# Patient Record
Sex: FEMALE | Race: BLACK OR AFRICAN AMERICAN | Employment: UNEMPLOYED | URBAN - METROPOLITAN AREA
[De-identification: names, ages, dates, MRNs, and addresses within clinical notes are randomized per-mention and may not be internally consistent; named-entity substitution may affect disease eponyms.]

---

## 2017-03-10 ENCOUNTER — HOSPITAL ENCOUNTER (EMERGENCY)
Age: 37
Discharge: HOME | End: 2017-03-10
Attending: EMERGENCY MEDICINE
Payer: MEDICARE

## 2017-03-10 VITALS
BODY MASS INDEX: 34.15 KG/M2 | WEIGHT: 200 LBS | SYSTOLIC BLOOD PRESSURE: 173 MMHG | HEART RATE: 89 BPM | RESPIRATION RATE: 18 BRPM | OXYGEN SATURATION: 100 % | DIASTOLIC BLOOD PRESSURE: 155 MMHG | TEMPERATURE: 97.2 F | HEIGHT: 64 IN

## 2017-03-10 DIAGNOSIS — G51.0 BELL'S PALSY: Primary | ICD-10-CM

## 2017-03-10 PROCEDURE — 6370000000 HC RX 637 (ALT 250 FOR IP): Performed by: EMERGENCY MEDICINE

## 2017-03-10 PROCEDURE — 99284 EMERGENCY DEPT VISIT MOD MDM: CPT

## 2017-03-10 PROCEDURE — 93005 ELECTROCARDIOGRAM TRACING: CPT

## 2017-03-10 RX ORDER — PREDNISONE 20 MG/1
TABLET ORAL
Qty: 25 TABLET | Refills: 0 | Status: SHIPPED | OUTPATIENT
Start: 2017-03-10 | End: 2017-03-20

## 2017-03-10 RX ORDER — ACYCLOVIR 200 MG/1
200 CAPSULE ORAL ONCE
Status: COMPLETED | OUTPATIENT
Start: 2017-03-10 | End: 2017-03-10

## 2017-03-10 RX ORDER — ACYCLOVIR 200 MG/1
200 CAPSULE ORAL
Qty: 50 CAPSULE | Refills: 0 | Status: SHIPPED | OUTPATIENT
Start: 2017-03-10 | End: 2017-03-20

## 2017-03-10 RX ORDER — PREDNISONE 20 MG/1
60 TABLET ORAL ONCE
Status: COMPLETED | OUTPATIENT
Start: 2017-03-10 | End: 2017-03-10

## 2017-03-10 RX ADMIN — PREDNISONE 60 MG: 20 TABLET ORAL at 14:04

## 2017-03-10 RX ADMIN — ACYCLOVIR 200 MG: 200 CAPSULE ORAL at 14:04

## 2017-03-14 LAB
EKG ATRIAL RATE: 71 BPM
EKG P AXIS: 56 DEGREES
EKG P-R INTERVAL: 174 MS
EKG Q-T INTERVAL: 412 MS
EKG QRS DURATION: 90 MS
EKG QTC CALCULATION (BAZETT): 447 MS
EKG R AXIS: 65 DEGREES
EKG T AXIS: 61 DEGREES
EKG VENTRICULAR RATE: 71 BPM

## 2017-05-31 ENCOUNTER — HOSPITAL ENCOUNTER (OUTPATIENT)
Dept: ULTRASOUND IMAGING | Age: 37
Discharge: HOME | End: 2017-05-31
Payer: MEDICARE

## 2017-05-31 DIAGNOSIS — E04.9 ENLARGED THYROID: ICD-10-CM

## 2017-05-31 PROCEDURE — 76536 US EXAM OF HEAD AND NECK: CPT

## 2017-07-18 ENCOUNTER — HOSPITAL ENCOUNTER (EMERGENCY)
Age: 37
Discharge: HOME | End: 2017-07-18
Attending: EMERGENCY MEDICINE
Payer: MEDICARE

## 2017-07-18 VITALS
BODY MASS INDEX: 35.19 KG/M2 | WEIGHT: 205 LBS | DIASTOLIC BLOOD PRESSURE: 103 MMHG | RESPIRATION RATE: 18 BRPM | HEART RATE: 93 BPM | SYSTOLIC BLOOD PRESSURE: 170 MMHG | TEMPERATURE: 97.3 F | OXYGEN SATURATION: 100 %

## 2017-07-18 PROCEDURE — 99283 EMERGENCY DEPT VISIT LOW MDM: CPT

## 2017-07-18 ASSESSMENT — ENCOUNTER SYMPTOMS
COUGH: 0
SHORTNESS OF BREATH: 0
VOMITING: 0
WHEEZING: 0
CHEST TIGHTNESS: 0
STRIDOR: 0
ABDOMINAL PAIN: 0
ABDOMINAL DISTENTION: 0
DIARRHEA: 0
SORE THROAT: 0
BLOOD IN STOOL: 0
NAUSEA: 0

## 2017-08-18 ENCOUNTER — HOSPITAL ENCOUNTER (EMERGENCY)
Age: 37
Discharge: LEFT BEFORE DISCHARGE | End: 2017-08-18
Payer: MEDICARE

## 2017-08-18 VITALS
OXYGEN SATURATION: 100 % | TEMPERATURE: 97.9 F | HEIGHT: 64 IN | BODY MASS INDEX: 34.15 KG/M2 | HEART RATE: 83 BPM | DIASTOLIC BLOOD PRESSURE: 92 MMHG | WEIGHT: 200 LBS | RESPIRATION RATE: 18 BRPM | SYSTOLIC BLOOD PRESSURE: 139 MMHG

## 2017-09-25 ENCOUNTER — HOSPITAL ENCOUNTER (EMERGENCY)
Age: 37
Discharge: HOME | End: 2017-09-25
Attending: EMERGENCY MEDICINE
Payer: MEDICARE

## 2017-09-25 VITALS
SYSTOLIC BLOOD PRESSURE: 153 MMHG | TEMPERATURE: 97.3 F | WEIGHT: 200 LBS | RESPIRATION RATE: 16 BRPM | BODY MASS INDEX: 34.33 KG/M2 | HEART RATE: 78 BPM | OXYGEN SATURATION: 99 % | DIASTOLIC BLOOD PRESSURE: 97 MMHG

## 2017-09-25 DIAGNOSIS — Z76.0 ENCOUNTER FOR MEDICATION REFILL: Primary | ICD-10-CM

## 2017-09-25 PROCEDURE — 99281 EMR DPT VST MAYX REQ PHY/QHP: CPT

## 2017-09-25 RX ORDER — LISINOPRIL 5 MG/1
5 TABLET ORAL DAILY
Qty: 21 TABLET | Refills: 0 | Status: SHIPPED | OUTPATIENT
Start: 2017-09-25 | End: 2017-10-15 | Stop reason: SDUPTHER

## 2017-09-25 ASSESSMENT — ENCOUNTER SYMPTOMS
COUGH: 0
VOMITING: 0
NAUSEA: 0
ABDOMINAL PAIN: 0
SHORTNESS OF BREATH: 0

## 2017-10-15 ENCOUNTER — HOSPITAL ENCOUNTER (EMERGENCY)
Age: 37
Discharge: HOME | End: 2017-10-15
Attending: EMERGENCY MEDICINE
Payer: MEDICARE

## 2017-10-15 VITALS
HEART RATE: 95 BPM | OXYGEN SATURATION: 98 % | RESPIRATION RATE: 15 BRPM | WEIGHT: 200 LBS | TEMPERATURE: 98.6 F | BODY MASS INDEX: 34.33 KG/M2 | DIASTOLIC BLOOD PRESSURE: 104 MMHG | SYSTOLIC BLOOD PRESSURE: 166 MMHG

## 2017-10-15 DIAGNOSIS — Z76.0 ENCOUNTER FOR MEDICATION REFILL: Primary | ICD-10-CM

## 2017-10-15 PROCEDURE — G0380 LEV 1 HOSP TYPE B ED VISIT: HCPCS

## 2017-10-15 RX ORDER — LISINOPRIL 5 MG/1
5 TABLET ORAL DAILY
Qty: 14 TABLET | Refills: 0 | Status: SHIPPED | OUTPATIENT
Start: 2017-10-15 | End: 2018-07-09

## 2017-10-15 RX ORDER — LISINOPRIL 10 MG/1
5 TABLET ORAL DAILY
Qty: 14 TABLET | Refills: 0 | Status: SHIPPED | OUTPATIENT
Start: 2017-10-15 | End: 2017-10-15 | Stop reason: DRUGHIGH

## 2017-10-15 ASSESSMENT — ENCOUNTER SYMPTOMS
CHEST TIGHTNESS: 0
SHORTNESS OF BREATH: 0

## 2017-10-15 NOTE — ED PROVIDER NOTES
[facility]     Emergency Department     Faculty Attestation    I performed a history and physical examination of the patient and discussed management with the resident. I reviewed the residents note and agree with the documented findings and plan of care. Any areas of disagreement are noted on the chart. I was personally present for the key portions of any procedures. I have documented in the chart those procedures where I was not present during the key portions. I have reviewed the emergency nurses triage note. I agree with the chief complaint, past medical history, past surgical history, allergies, medications, social and family history as documented unless otherwise noted below. For Physician Assistant/ Nurse Practitioner cases/documentation I have personally evaluated this patient and have completed at least one if not all key elements of the E/M (history, physical exam, and MDM). Additional findings are as noted. Patient here for refill for lisinopril, patient has no complaints, sitting up eating a bag of Cheetos, chest clear, heart exam normal.  Patient has not missed a dose of her Lisinopril.     Mukund Nunn MD  Attending Emergency  Physician              Crorina Dunn MD  10/15/17 1200

## 2017-10-15 NOTE — ED PROVIDER NOTES
0.5 tablets by mouth daily 10/15/17  Yes Robbin YorkSHAE rodriguez   omeprazole (PRILOSEC) 10 MG delayed release capsule Take 1 capsule by mouth daily 2/3/17   Bruce Tejada MD   ibuprofen (ADVIL;MOTRIN) 800 MG tablet Take 1 tablet by mouth every 8 hours as needed for Pain 8/25/16   Stephanie Norwood PA-C   oxyCODONE-acetaminophen (PERCOCET) 5-325 MG per tablet Take 1-2 tablets by mouth 2 times daily 12/20/15   Arlicarrillo Rise, DO   beclomethasone (QVAR) 80 MCG/ACT inhaler Inhale 2 puffs into the lungs 2 times daily    Historical Provider, MD       REVIEW OF SYSTEMS    (2-9 systems for level 4, 10 or more for level 5)      Review of Systems   Eyes: Negative for visual disturbance. Respiratory: Negative for chest tightness and shortness of breath. Neurological: Negative for headaches. PHYSICAL EXAM   (up to 7 for level 4, 8 or more for level 5)      INITIAL VITALS:  weight is 200 lb (90.7 kg). Her oral temperature is 98.6 °F (37 °C). Her blood pressure is 166/104 (abnormal) and her pulse is 95. Her respiration is 15 and oxygen saturation is 98%. Physical Exam   Constitutional: She is oriented to person, place, and time. She appears well-developed and well-nourished. No distress. HENT:   Head: Normocephalic and atraumatic. Neck: Normal range of motion. Neck supple. Cardiovascular: Normal rate. Pulmonary/Chest: Effort normal. No respiratory distress. Neurological: She is alert and oriented to person, place, and time. Skin: Skin is warm and dry. Psychiatric: She has a normal mood and affect. Her behavior is normal. Judgment and thought content normal.   Nursing note and vitals reviewed. DIFFERENTIAL  DIAGNOSIS   Hypertension    PLAN (LABS / IMAGING / EKG):  No orders of the defined types were placed in this encounter.       MEDICATIONS ORDERED:  Orders Placed This Encounter   Medications    lisinopril (PRINIVIL) 10 MG tablet     Sig: Take 0.5 tablets by mouth daily     Dispense:  14 tablet Refill:  0       Controlled Substances Monitoring:      DIAGNOSTIC RESULTS / EMERGENCY DEPARTMENT COURSE / MDM     RADIOLOGY:   I directly visualized (with the attending physician) the following  images and reviewed the radiologist interpretations:  No results found. No orders to display       LABS:  No results found for this visit on 10/15/17. EMERGENCY DEPARTMENT COURSE:  I did discuss the importance of maintaining her PCP appointments as this is her second request for this refill for the same medication. Patient reports she is understanding and promises to attend the next appointment. CONSULTS:  None    PROCEDURES:  None    FINAL IMPRESSION      1.  Encounter for medication refill          DISPOSITION / PLAN     DISPOSITION Decision To Discharge    PATIENT REFERRED TO:  [facility] CNP  [address]  [place] [address]  [phone]            DISCHARGE MEDICATIONS:  New Prescriptions    LISINOPRIL (PRINIVIL) 10 MG TABLET    Take 0.5 tablets by mouth daily       Shu Marie, [address]   Emergency Medicine Nurse Practitioner    (Please note that portions of this note were completed with a voice recognition program.  Efforts were made to edit the dictations but occasionally words are mis-transcribed.)        Shu Marie CNP  10/15/17 7250

## 2018-01-08 ENCOUNTER — HOSPITAL ENCOUNTER (EMERGENCY)
Age: 38
Discharge: HOME | End: 2018-01-08
Attending: EMERGENCY MEDICINE
Payer: MEDICAID

## 2018-01-08 VITALS
RESPIRATION RATE: 18 BRPM | HEART RATE: 88 BPM | OXYGEN SATURATION: 98 % | BODY MASS INDEX: 34.15 KG/M2 | SYSTOLIC BLOOD PRESSURE: 103 MMHG | TEMPERATURE: 98.2 F | HEIGHT: 64 IN | WEIGHT: 200 LBS | DIASTOLIC BLOOD PRESSURE: 59 MMHG

## 2018-01-08 DIAGNOSIS — J02.9 PHARYNGITIS, UNSPECIFIED ETIOLOGY: Primary | ICD-10-CM

## 2018-01-08 DIAGNOSIS — K02.9 DENTAL CARIES: ICD-10-CM

## 2018-01-08 PROCEDURE — 6370000000 HC RX 637 (ALT 250 FOR IP): Performed by: EMERGENCY MEDICINE

## 2018-01-08 PROCEDURE — 99282 EMERGENCY DEPT VISIT SF MDM: CPT

## 2018-01-08 RX ORDER — IBUPROFEN 600 MG/1
600 TABLET ORAL EVERY 6 HOURS PRN
Qty: 30 TABLET | Refills: 0 | Status: SHIPPED | OUTPATIENT
Start: 2018-01-08 | End: 2018-01-08

## 2018-01-08 RX ORDER — PENICILLIN V POTASSIUM 500 MG/1
500 TABLET ORAL 4 TIMES DAILY
Qty: 28 TABLET | Refills: 0 | Status: SHIPPED | OUTPATIENT
Start: 2018-01-08 | End: 2018-01-08

## 2018-01-08 RX ORDER — IBUPROFEN 800 MG/1
800 TABLET ORAL ONCE
Status: COMPLETED | OUTPATIENT
Start: 2018-01-08 | End: 2018-01-08

## 2018-01-08 RX ORDER — IBUPROFEN 800 MG/1
800 TABLET ORAL EVERY 8 HOURS PRN
Qty: 30 TABLET | Refills: 0 | Status: SHIPPED | OUTPATIENT
Start: 2018-01-08 | End: 2019-12-16 | Stop reason: SDUPTHER

## 2018-01-08 RX ORDER — PENICILLIN V POTASSIUM 500 MG/1
500 TABLET ORAL 3 TIMES DAILY
Qty: 30 TABLET | Refills: 0 | Status: SHIPPED | OUTPATIENT
Start: 2018-01-08 | End: 2018-01-18

## 2018-01-08 RX ORDER — PENICILLIN V POTASSIUM 250 MG/1
500 TABLET ORAL ONCE
Status: COMPLETED | OUTPATIENT
Start: 2018-01-08 | End: 2018-01-08

## 2018-01-08 RX ADMIN — IBUPROFEN 800 MG: 800 TABLET, FILM COATED ORAL at 12:36

## 2018-01-08 RX ADMIN — PENICILLIN V POTASSIUM 500 MG: 250 TABLET ORAL at 12:36

## 2018-01-08 ASSESSMENT — PAIN DESCRIPTION - LOCATION: LOCATION: EAR;MOUTH

## 2018-01-08 ASSESSMENT — ENCOUNTER SYMPTOMS
APNEA: 0
ANAL BLEEDING: 0
BACK PAIN: 0
SORE THROAT: 1
CHEST TIGHTNESS: 0
ABDOMINAL DISTENTION: 0

## 2018-01-08 ASSESSMENT — PAIN DESCRIPTION - FREQUENCY: FREQUENCY: CONTINUOUS

## 2018-01-08 ASSESSMENT — PAIN SCALES - GENERAL
PAINLEVEL_OUTOF10: 10
PAINLEVEL_OUTOF10: 10

## 2018-01-08 ASSESSMENT — PAIN DESCRIPTION - PAIN TYPE: TYPE: ACUTE PAIN

## 2018-01-08 ASSESSMENT — PAIN DESCRIPTION - ORIENTATION: ORIENTATION: LEFT

## 2018-01-08 ASSESSMENT — PAIN DESCRIPTION - DESCRIPTORS: DESCRIPTORS: CONSTANT

## 2018-01-08 ASSESSMENT — PAIN DESCRIPTION - PROGRESSION: CLINICAL_PROGRESSION: NOT CHANGED

## 2018-01-08 NOTE — ED PROVIDER NOTES
[address] [facility]     Emergency Department     Faculty Attestation    I performed a history and physical examination of the patient and discussed management with the resident. I have reviewed and agree with the residents findings including all diagnostic interpretations, and treatment plans as written. Any areas of disagreement are noted on the chart. I was personally present for the key portions of any procedures. I have documented in the chart those procedures where I was not present during the key portions. I have reviewed the emergency nurses triage note. I agree with the chief complaint, past medical history, past surgical history, allergies, medications, social and family history as documented unless otherwise noted below. Documentation of the HPI, Physical Exam and Medical Decision Making performed by scribmirta is based on my personal performance of the HPI, PE and MDM. For Physician Assistant/ Nurse Practitioner cases/documentation I have personally evaluated this patient and have completed at least one if not all key elements of the E/M (history, physical exam, and MDM). Additional findings are as noted. Primary Care Physician: Tommy Guerra CNP    History: This is a 44 y.o. female who presents to the Emergency Department with complaint of Sore throat, dental pain, ear pain. Physical:   height is 5' 4\" (1.626 m) and weight is 200 lb (90.7 kg). Her oral temperature is 98.2 °F (36.8 °C). Her blood pressure is 103/59 (abnormal) and her pulse is 88. Her respiration is 18 and oxygen saturation is 98%. Temperament are bilaterally posterior pharynx is minimally erythematous is no exudate is midline mucous members are moist, there is no dental abscess. Dental caries are noted. No tongue elevation no pooling of oral secretions, airway is patent.     Impression: Dental pain, caries,Pharyngitis    Plan: Antibiotics, analgesia, and paternal clinic
occasionally words are mis-transcribed.)    Carmen Restrepo  Emergency Medicine Resident           Carmen Restrepo MD  Resident  01/08/18 3290

## 2018-07-09 ENCOUNTER — HOSPITAL ENCOUNTER (EMERGENCY)
Age: 38
Discharge: HOME | End: 2018-07-09
Attending: EMERGENCY MEDICINE
Payer: MEDICARE

## 2018-07-09 VITALS
HEART RATE: 84 BPM | SYSTOLIC BLOOD PRESSURE: 143 MMHG | TEMPERATURE: 97.7 F | DIASTOLIC BLOOD PRESSURE: 89 MMHG | OXYGEN SATURATION: 100 % | RESPIRATION RATE: 15 BRPM

## 2018-07-09 DIAGNOSIS — Z76.0 ENCOUNTER FOR MEDICATION REFILL: Primary | ICD-10-CM

## 2018-07-09 PROCEDURE — 99281 EMR DPT VST MAYX REQ PHY/QHP: CPT

## 2018-07-09 PROCEDURE — 6370000000 HC RX 637 (ALT 250 FOR IP): Performed by: NURSE PRACTITIONER

## 2018-07-09 RX ORDER — LISINOPRIL 5 MG/1
5 TABLET ORAL DAILY
Qty: 30 TABLET | Refills: 0 | Status: SHIPPED | OUTPATIENT
Start: 2018-07-09

## 2018-07-09 RX ORDER — LISINOPRIL 10 MG/1
5 TABLET ORAL DAILY
Status: DISCONTINUED | OUTPATIENT
Start: 2018-07-09 | End: 2018-07-09 | Stop reason: HOSPADM

## 2018-07-09 RX ADMIN — LISINOPRIL 5 MG: 10 TABLET ORAL at 12:50

## 2018-07-09 ASSESSMENT — ENCOUNTER SYMPTOMS: SHORTNESS OF BREATH: 0

## 2019-08-07 ENCOUNTER — HOSPITAL ENCOUNTER (INPATIENT)
Age: 39
LOS: 3 days | Discharge: HOME | DRG: 720 | End: 2019-08-10
Attending: EMERGENCY MEDICINE | Admitting: INTERNAL MEDICINE
Payer: MEDICARE

## 2019-08-07 ENCOUNTER — APPOINTMENT (OUTPATIENT)
Dept: GENERAL RADIOLOGY | Age: 39
DRG: 720 | End: 2019-08-07
Payer: MEDICARE

## 2019-08-07 DIAGNOSIS — R52 BODY ACHES: ICD-10-CM

## 2019-08-07 DIAGNOSIS — J02.0 STREP PHARYNGITIS: Primary | ICD-10-CM

## 2019-08-07 LAB
-: ABNORMAL
-: NORMAL
ABSOLUTE EOS #: 0 K/UL (ref 0–0.4)
ABSOLUTE IMMATURE GRANULOCYTE: 0 K/UL (ref 0–0.3)
ABSOLUTE LYMPH #: 1.45 K/UL (ref 1–4.8)
ABSOLUTE MONO #: 1.86 K/UL (ref 0.1–0.8)
ALBUMIN SERPL-MCNC: 4.2 G/DL (ref 3.5–5.2)
ALBUMIN/GLOBULIN RATIO: 0.9 (ref 1–2.5)
ALLEN TEST: ABNORMAL
ALP BLD-CCNC: 62 U/L (ref 35–104)
ALT SERPL-CCNC: 17 U/L (ref 5–33)
AMORPHOUS: ABNORMAL
ANION GAP SERPL CALCULATED.3IONS-SCNC: 15 MMOL/L (ref 9–17)
ANION GAP: 11 MMOL/L (ref 7–16)
AST SERPL-CCNC: 60 U/L
ATYPICAL LYMPHOCYTE ABSOLUTE COUNT: 0.21 K/UL
ATYPICAL LYMPHOCYTES: 1 %
BACTERIA: ABNORMAL
BASOPHILS # BLD: 0 % (ref 0–2)
BASOPHILS ABSOLUTE: 0 K/UL (ref 0–0.2)
BILIRUB SERPL-MCNC: 0.69 MG/DL (ref 0.3–1.2)
BILIRUBIN URINE: NEGATIVE
BUN BLDV-MCNC: 4 MG/DL (ref 6–20)
BUN/CREAT BLD: ABNORMAL (ref 9–20)
CALCIUM SERPL-MCNC: 9.3 MG/DL (ref 8.6–10.4)
CASTS UA: ABNORMAL /LPF (ref 0–8)
CHLORIDE BLD-SCNC: 95 MMOL/L (ref 98–107)
CO2: 19 MMOL/L (ref 20–31)
COLOR: YELLOW
CREAT SERPL-MCNC: 0.52 MG/DL (ref 0.5–0.9)
CRYSTALS, UA: ABNORMAL /HPF
DIFFERENTIAL TYPE: ABNORMAL
DIRECT EXAM: ABNORMAL
DIRECT EXAM: NORMAL
EOSINOPHILS RELATIVE PERCENT: 0 % (ref 1–4)
EPITHELIAL CELLS UA: ABNORMAL /HPF (ref 0–5)
FIO2: ABNORMAL
GFR AFRICAN AMERICAN: >60 ML/MIN
GFR NON-AFRICAN AMERICAN: >60 ML/MIN
GFR NON-AFRICAN AMERICAN: >60 ML/MIN
GFR SERPL CREATININE-BSD FRML MDRD: >60 ML/MIN
GFR SERPL CREATININE-BSD FRML MDRD: ABNORMAL ML/MIN/{1.73_M2}
GFR SERPL CREATININE-BSD FRML MDRD: ABNORMAL ML/MIN/{1.73_M2}
GFR SERPL CREATININE-BSD FRML MDRD: NORMAL ML/MIN/{1.73_M2}
GLUCOSE BLD-MCNC: 124 MG/DL (ref 70–99)
GLUCOSE BLD-MCNC: 133 MG/DL (ref 74–100)
GLUCOSE URINE: NEGATIVE
HCO3 VENOUS: 20.5 MMOL/L (ref 22–29)
HCT VFR BLD CALC: 29.5 % (ref 36.3–47.1)
HEMOGLOBIN: 7.6 G/DL (ref 11.9–15.1)
IMMATURE GRANULOCYTES: 0 %
INR BLD: 1
KETONES, URINE: NEGATIVE
LACTIC ACID, SEPSIS WHOLE BLOOD: 2.5 MMOL/L (ref 0.5–1.9)
LACTIC ACID, SEPSIS: ABNORMAL MMOL/L (ref 0.5–1.9)
LEUKOCYTE ESTERASE, URINE: NEGATIVE
LYMPHOCYTES # BLD: 7 % (ref 24–44)
Lab: ABNORMAL
Lab: NORMAL
MCH RBC QN AUTO: 15.2 PG (ref 25.2–33.5)
MCHC RBC AUTO-ENTMCNC: 25.8 G/DL (ref 28.4–34.8)
MCV RBC AUTO: 59.1 FL (ref 82.6–102.9)
MODE: ABNORMAL
MONOCYTES # BLD: 9 % (ref 1–7)
MORPHOLOGY: ABNORMAL
MUCUS: ABNORMAL
NEGATIVE BASE EXCESS, VEN: 3 (ref 0–2)
NITRITE, URINE: NEGATIVE
NRBC AUTOMATED: 0 PER 100 WBC
O2 DEVICE/FLOW/%: ABNORMAL
O2 SAT, VEN: 56 % (ref 60–85)
OTHER OBSERVATIONS UA: ABNORMAL
PARTIAL THROMBOPLASTIN TIME: 24.7 SEC (ref 20.5–30.5)
PATIENT TEMP: ABNORMAL
PCO2, VEN: 30.1 MM HG (ref 41–51)
PDW BLD-RTO: 22.4 % (ref 11.8–14.4)
PH UA: 8 (ref 5–8)
PH VENOUS: 7.44 (ref 7.32–7.43)
PLATELET # BLD: ABNORMAL K/UL (ref 138–453)
PLATELET ESTIMATE: ABNORMAL
PLATELET, FLUORESCENCE: 299 K/UL (ref 138–453)
PLATELET, IMMATURE FRACTION: 5 % (ref 1.1–10.3)
PMV BLD AUTO: ABNORMAL FL (ref 8.1–13.5)
PO2, VEN: 27.9 MM HG (ref 30–50)
POC CHLORIDE: 101 MMOL/L (ref 98–107)
POC CREATININE: 0.53 MG/DL (ref 0.51–1.19)
POC HEMATOCRIT: 32 % (ref 36–46)
POC HEMOGLOBIN: 10.8 G/DL (ref 12–16)
POC IONIZED CALCIUM: 1.12 MMOL/L (ref 1.15–1.33)
POC LACTIC ACID: 0.69 MMOL/L (ref 0.56–1.39)
POC LACTIC ACID: 2.3 MMOL/L (ref 0.56–1.39)
POC PCO2 TEMP: ABNORMAL MM HG
POC PH TEMP: ABNORMAL
POC PO2 TEMP: ABNORMAL MM HG
POC POTASSIUM: 4 MMOL/L (ref 3.5–4.5)
POC SODIUM: 132 MMOL/L (ref 138–146)
POSITIVE BASE EXCESS, VEN: ABNORMAL (ref 0–3)
POTASSIUM SERPL-SCNC: 4.1 MMOL/L (ref 3.7–5.3)
PROTEIN UA: ABNORMAL
PROTHROMBIN TIME: 10.8 SEC (ref 9–12)
RBC # BLD: 4.99 M/UL (ref 3.95–5.11)
RBC # BLD: ABNORMAL 10*6/UL
RBC UA: ABNORMAL /HPF (ref 0–4)
REASON FOR REJECTION: NORMAL
RENAL EPITHELIAL, UA: ABNORMAL /HPF
SAMPLE SITE: ABNORMAL
SEG NEUTROPHILS: 83 % (ref 36–66)
SEGMENTED NEUTROPHILS ABSOLUTE COUNT: 17.18 K/UL (ref 1.8–7.7)
SODIUM BLD-SCNC: 129 MMOL/L (ref 135–144)
SPECIFIC GRAVITY UA: 1.01 (ref 1–1.03)
SPECIMEN DESCRIPTION: ABNORMAL
SPECIMEN DESCRIPTION: NORMAL
TOTAL CK: 266 U/L (ref 26–192)
TOTAL CO2, VENOUS: 22 MMOL/L (ref 23–30)
TOTAL PROTEIN: 9.1 G/DL (ref 6.4–8.3)
TRICHOMONAS: ABNORMAL
TROPONIN INTERP: NORMAL
TROPONIN INTERP: NORMAL
TROPONIN T: NORMAL NG/ML
TROPONIN T: NORMAL NG/ML
TROPONIN, HIGH SENSITIVITY: 9 NG/L (ref 0–14)
TROPONIN, HIGH SENSITIVITY: 9 NG/L (ref 0–14)
TURBIDITY: CLEAR
URINE HGB: ABNORMAL
UROBILINOGEN, URINE: NORMAL
WBC # BLD: 20.7 K/UL (ref 3.5–11.3)
WBC # BLD: ABNORMAL 10*3/UL
WBC UA: ABNORMAL /HPF (ref 0–5)
YEAST: ABNORMAL
ZZ NTE CLEAN UP: ORDERED TEST: NORMAL
ZZ NTE WITH NAME CLEAN UP: SPECIMEN SOURCE: NORMAL

## 2019-08-07 PROCEDURE — 96367 TX/PROPH/DG ADDL SEQ IV INF: CPT

## 2019-08-07 PROCEDURE — 93005 ELECTROCARDIOGRAM TRACING: CPT | Performed by: STUDENT IN AN ORGANIZED HEALTH CARE EDUCATION/TRAINING PROGRAM

## 2019-08-07 PROCEDURE — 86901 BLOOD TYPING SEROLOGIC RH(D): CPT

## 2019-08-07 PROCEDURE — 82947 ASSAY GLUCOSE BLOOD QUANT: CPT

## 2019-08-07 PROCEDURE — 84484 ASSAY OF TROPONIN QUANT: CPT

## 2019-08-07 PROCEDURE — 6370000000 HC RX 637 (ALT 250 FOR IP): Performed by: STUDENT IN AN ORGANIZED HEALTH CARE EDUCATION/TRAINING PROGRAM

## 2019-08-07 PROCEDURE — 87880 STREP A ASSAY W/OPTIC: CPT

## 2019-08-07 PROCEDURE — 82550 ASSAY OF CK (CPK): CPT

## 2019-08-07 PROCEDURE — 84295 ASSAY OF SERUM SODIUM: CPT

## 2019-08-07 PROCEDURE — 86900 BLOOD TYPING SEROLOGIC ABO: CPT

## 2019-08-07 PROCEDURE — 87086 URINE CULTURE/COLONY COUNT: CPT

## 2019-08-07 PROCEDURE — 82330 ASSAY OF CALCIUM: CPT

## 2019-08-07 PROCEDURE — 96365 THER/PROPH/DIAG IV INF INIT: CPT

## 2019-08-07 PROCEDURE — 2580000003 HC RX 258: Performed by: STUDENT IN AN ORGANIZED HEALTH CARE EDUCATION/TRAINING PROGRAM

## 2019-08-07 PROCEDURE — 2060000000 HC ICU INTERMEDIATE R&B

## 2019-08-07 PROCEDURE — 85610 PROTHROMBIN TIME: CPT

## 2019-08-07 PROCEDURE — 84132 ASSAY OF SERUM POTASSIUM: CPT

## 2019-08-07 PROCEDURE — 81001 URINALYSIS AUTO W/SCOPE: CPT

## 2019-08-07 PROCEDURE — 71045 X-RAY EXAM CHEST 1 VIEW: CPT

## 2019-08-07 PROCEDURE — 85055 RETICULATED PLATELET ASSAY: CPT

## 2019-08-07 PROCEDURE — 99285 EMERGENCY DEPT VISIT HI MDM: CPT

## 2019-08-07 PROCEDURE — 6360000002 HC RX W HCPCS: Performed by: STUDENT IN AN ORGANIZED HEALTH CARE EDUCATION/TRAINING PROGRAM

## 2019-08-07 PROCEDURE — 82803 BLOOD GASES ANY COMBINATION: CPT

## 2019-08-07 PROCEDURE — 86920 COMPATIBILITY TEST SPIN: CPT

## 2019-08-07 PROCEDURE — 80053 COMPREHEN METABOLIC PANEL: CPT

## 2019-08-07 PROCEDURE — 87804 INFLUENZA ASSAY W/OPTIC: CPT

## 2019-08-07 PROCEDURE — 86850 RBC ANTIBODY SCREEN: CPT

## 2019-08-07 PROCEDURE — 87040 BLOOD CULTURE FOR BACTERIA: CPT

## 2019-08-07 PROCEDURE — 82565 ASSAY OF CREATININE: CPT

## 2019-08-07 PROCEDURE — 96366 THER/PROPH/DIAG IV INF ADDON: CPT

## 2019-08-07 PROCEDURE — 85014 HEMATOCRIT: CPT

## 2019-08-07 PROCEDURE — 83605 ASSAY OF LACTIC ACID: CPT

## 2019-08-07 PROCEDURE — 85025 COMPLETE CBC W/AUTO DIFF WBC: CPT

## 2019-08-07 PROCEDURE — 82435 ASSAY OF BLOOD CHLORIDE: CPT

## 2019-08-07 PROCEDURE — 85730 THROMBOPLASTIN TIME PARTIAL: CPT

## 2019-08-07 RX ORDER — 0.9 % SODIUM CHLORIDE 0.9 %
1000 INTRAVENOUS SOLUTION INTRAVENOUS ONCE
Status: COMPLETED | OUTPATIENT
Start: 2019-08-07 | End: 2019-08-08

## 2019-08-07 RX ORDER — ACETAMINOPHEN 500 MG
1000 TABLET ORAL ONCE
Status: COMPLETED | OUTPATIENT
Start: 2019-08-07 | End: 2019-08-07

## 2019-08-07 RX ADMIN — Medication 1250 MG: at 20:14

## 2019-08-07 RX ADMIN — ACETAMINOPHEN 1000 MG: 500 TABLET ORAL at 18:02

## 2019-08-07 RX ADMIN — SODIUM CHLORIDE 1000 ML: 9 INJECTION, SOLUTION INTRAVENOUS at 18:02

## 2019-08-07 RX ADMIN — PIPERACILLIN AND TAZOBACTAM 3.38 G: 3; .375 INJECTION, POWDER, FOR SOLUTION INTRAVENOUS at 19:41

## 2019-08-07 ASSESSMENT — PAIN DESCRIPTION - LOCATION: LOCATION: GENERALIZED

## 2019-08-07 ASSESSMENT — PAIN SCALES - GENERAL
PAINLEVEL_OUTOF10: 10
PAINLEVEL_OUTOF10: 9

## 2019-08-07 ASSESSMENT — PAIN DESCRIPTION - PAIN TYPE: TYPE: ACUTE PAIN

## 2019-08-07 NOTE — ED PROVIDER NOTES
CO2, Venous 22 (*)     Negative Base Excess, Callum 3 (*)     O2 Sat, Callum 56 (*)     All other components within normal limits   LACTIC ACID,POINT OF CARE - Abnormal; Notable for the following components:    POC Lactic Acid 2.30 (*)     All other components within normal limits   POCT GLUCOSE - Abnormal; Notable for the following components:    POC Glucose 133 (*)     All other components within normal limits   RAPID INFLUENZA A/B ANTIGENS   CULTURE BLOOD #1   CULTURE BLOOD #1   URINE CULTURE   PROTIME-INR   TROPONIN   TROPONIN   APTT   POTASSIUM (POC)   CHLORIDE (POC)   IMMATURE PLATELET FRACTION   LACTATE, SEPSIS   CK   POC BLOOD GAS AND CHEMISTRY   POC BLOOD GAS AND CHEMISTRY   CREATININE W/GFR POINT OF CARE   ANION GAP (CALC) POC   TYPE AND SCREEN   PREPARE RBC (CROSSMATCH)         RADIOLOGY:  Xr Chest Portable    Result Date: 8/7/2019  EXAMINATION: ONE XRAY VIEW OF THE CHEST 8/7/2019 5:50 pm COMPARISON: 06/29/2015 HISTORY: ORDERING SYSTEM PROVIDED HISTORY: concern for pneumonia TECHNOLOGIST PROVIDED HISTORY: concern for pneumonia Reason for Exam: port Upright, congestion, cough Acuity: Unknown Type of Exam: Unknown FINDINGS: Right greater than left curvilinear parenchymal scars again noted. There are dense breast shadows causing hazy opacity at both lung bases. Lungs are otherwise grossly clear. No pneumothorax or pleural fluid. Heart size and configuration are normal.  No acute bone finding. Right greater than left parenchymal scars. Hazy opacity at the lung bases most consistent with overlying dense breast shadows. No acute cardiopulmonary disease.          EKG  EKG Interpretation    Interpreted by me    Rhythm: normal sinus   Rate: normal  Axis: normal  Ectopy: none  Conduction: normal  ST Segments: no acute change  T Waves: no acute change  Q Waves: none    Clinical Impression: no acute changes and normal EKG    All EKG's are interpreted by the Emergency Department Physicianwho either signs or

## 2019-08-08 PROBLEM — E87.6 HYPOKALEMIA: Status: ACTIVE | Noted: 2019-08-08

## 2019-08-08 PROBLEM — F19.10 POLYSUBSTANCE ABUSE (HCC): Status: ACTIVE | Noted: 2019-08-08

## 2019-08-08 PROBLEM — D72.9 NEUTROPHILIC LEUKOCYTOSIS: Status: ACTIVE | Noted: 2019-08-08

## 2019-08-08 PROBLEM — D64.9 SYMPTOMATIC ANEMIA: Status: ACTIVE | Noted: 2019-08-08

## 2019-08-08 PROBLEM — E87.1 HYPONATREMIA: Status: ACTIVE | Noted: 2019-08-08

## 2019-08-08 PROBLEM — A40.0 SEPSIS DUE TO GROUP A STREPTOCOCCUS (HCC): Status: ACTIVE | Noted: 2019-08-08

## 2019-08-08 PROBLEM — D50.9 IRON DEFICIENCY ANEMIA: Status: ACTIVE | Noted: 2019-08-08

## 2019-08-08 PROBLEM — F14.90 CRACK COCAINE USE: Status: ACTIVE | Noted: 2019-08-08

## 2019-08-08 LAB
ABSOLUTE RETIC #: 0.08 M/UL (ref 0.03–0.08)
ALBUMIN SERPL-MCNC: 3.6 G/DL (ref 3.5–5.2)
ALBUMIN/GLOBULIN RATIO: 0.8 (ref 1–2.5)
ALP BLD-CCNC: 58 U/L (ref 35–104)
ALT SERPL-CCNC: 12 U/L (ref 5–33)
ANION GAP SERPL CALCULATED.3IONS-SCNC: 16 MMOL/L (ref 9–17)
AST SERPL-CCNC: 39 U/L
BILIRUB SERPL-MCNC: 0.66 MG/DL (ref 0.3–1.2)
BILIRUBIN DIRECT: 0.12 MG/DL
BILIRUBIN, INDIRECT: 0.54 MG/DL (ref 0–1)
BUN BLDV-MCNC: 5 MG/DL (ref 6–20)
BUN/CREAT BLD: ABNORMAL (ref 9–20)
C-REACTIVE PROTEIN: 123.4 MG/L (ref 0–5)
CALCIUM SERPL-MCNC: 8.9 MG/DL (ref 8.6–10.4)
CHLORIDE BLD-SCNC: 96 MMOL/L (ref 98–107)
CO2: 19 MMOL/L (ref 20–31)
CREAT SERPL-MCNC: 0.55 MG/DL (ref 0.5–0.9)
CULTURE: NORMAL
EKG ATRIAL RATE: 108 BPM
EKG P AXIS: 61 DEGREES
EKG P-R INTERVAL: 144 MS
EKG Q-T INTERVAL: 334 MS
EKG QRS DURATION: 86 MS
EKG QTC CALCULATION (BAZETT): 447 MS
EKG R AXIS: 77 DEGREES
EKG T AXIS: 64 DEGREES
EKG VENTRICULAR RATE: 108 BPM
FERRITIN: 29 UG/L (ref 13–150)
GFR AFRICAN AMERICAN: >60 ML/MIN
GFR NON-AFRICAN AMERICAN: >60 ML/MIN
GFR SERPL CREATININE-BSD FRML MDRD: ABNORMAL ML/MIN/{1.73_M2}
GFR SERPL CREATININE-BSD FRML MDRD: ABNORMAL ML/MIN/{1.73_M2}
GLOBULIN: ABNORMAL G/DL (ref 1.5–3.8)
GLUCOSE BLD-MCNC: 124 MG/DL (ref 70–99)
HAPTOGLOBIN: 237 MG/DL (ref 30–200)
HCT VFR BLD CALC: 26.6 % (ref 36.3–47.1)
HCT VFR BLD CALC: 26.8 % (ref 36.3–47.1)
HCT VFR BLD CALC: 27.6 % (ref 36.3–47.1)
HEMOGLOBIN: 6.6 G/DL (ref 11.9–15.1)
HEMOGLOBIN: 7.1 G/DL (ref 11.9–15.1)
HEMOGLOBIN: 7.1 G/DL (ref 11.9–15.1)
IMMATURE RETIC FRACT: 22.1 % (ref 2.7–18.3)
IRON SATURATION: 5 % (ref 20–55)
IRON: 18 UG/DL (ref 37–145)
LACTATE DEHYDROGENASE: 581 U/L (ref 135–214)
LACTIC ACID, WHOLE BLOOD: 1.3 MMOL/L (ref 0.7–2.1)
LACTIC ACID: NORMAL MMOL/L
Lab: NORMAL
MAGNESIUM: 1.8 MG/DL (ref 1.6–2.6)
MCH RBC QN AUTO: 15.5 PG (ref 25.2–33.5)
MCHC RBC AUTO-ENTMCNC: 25.7 G/DL (ref 28.4–34.8)
MCV RBC AUTO: 60.1 FL (ref 82.6–102.9)
NRBC AUTOMATED: 0 PER 100 WBC
PDW BLD-RTO: 22.4 % (ref 11.8–14.4)
PLATELET # BLD: ABNORMAL K/UL (ref 138–453)
PLATELET, FLUORESCENCE: 270 K/UL (ref 138–453)
PLATELET, IMMATURE FRACTION: 5 % (ref 1.1–10.3)
PMV BLD AUTO: ABNORMAL FL (ref 8.1–13.5)
POC LACTIC ACID: 3.59 MMOL/L (ref 0.56–1.39)
POTASSIUM SERPL-SCNC: 2.8 MMOL/L (ref 3.7–5.3)
POTASSIUM SERPL-SCNC: 3.4 MMOL/L (ref 3.7–5.3)
RBC # BLD: 4.59 M/UL (ref 3.95–5.11)
RETIC %: 1.8 % (ref 0.5–1.9)
RETIC HEMOGLOBIN: 16.6 PG (ref 28.2–35.7)
SEDIMENTATION RATE, ERYTHROCYTE: 32 MM (ref 0–20)
SODIUM BLD-SCNC: 131 MMOL/L (ref 135–144)
SPECIMEN DESCRIPTION: NORMAL
TOTAL IRON BINDING CAPACITY: 399 UG/DL (ref 250–450)
TOTAL PROTEIN: 8.3 G/DL (ref 6.4–8.3)
UNSATURATED IRON BINDING CAPACITY: 381 UG/DL (ref 112–347)
VANCOMYCIN TROUGH DATE LAST DOSE: ABNORMAL
VANCOMYCIN TROUGH DOSE AMOUNT: ABNORMAL
VANCOMYCIN TROUGH TIME LAST DOSE: ABNORMAL
VANCOMYCIN TROUGH: 8.2 UG/ML (ref 10–20)
WBC # BLD: 25.1 K/UL (ref 3.5–11.3)

## 2019-08-08 PROCEDURE — 85651 RBC SED RATE NONAUTOMATED: CPT

## 2019-08-08 PROCEDURE — 76937 US GUIDE VASCULAR ACCESS: CPT

## 2019-08-08 PROCEDURE — 85014 HEMATOCRIT: CPT

## 2019-08-08 PROCEDURE — 2580000003 HC RX 258: Performed by: NURSE PRACTITIONER

## 2019-08-08 PROCEDURE — 6370000000 HC RX 637 (ALT 250 FOR IP): Performed by: INTERNAL MEDICINE

## 2019-08-08 PROCEDURE — 2580000003 HC RX 258: Performed by: INTERNAL MEDICINE

## 2019-08-08 PROCEDURE — 85027 COMPLETE CBC AUTOMATED: CPT

## 2019-08-08 PROCEDURE — 80048 BASIC METABOLIC PNL TOTAL CA: CPT

## 2019-08-08 PROCEDURE — 6370000000 HC RX 637 (ALT 250 FOR IP): Performed by: NURSE PRACTITIONER

## 2019-08-08 PROCEDURE — 85055 RETICULATED PLATELET ASSAY: CPT

## 2019-08-08 PROCEDURE — 84132 ASSAY OF SERUM POTASSIUM: CPT

## 2019-08-08 PROCEDURE — 83605 ASSAY OF LACTIC ACID: CPT

## 2019-08-08 PROCEDURE — 2500000003 HC RX 250 WO HCPCS: Performed by: INTERNAL MEDICINE

## 2019-08-08 PROCEDURE — 83550 IRON BINDING TEST: CPT

## 2019-08-08 PROCEDURE — 6360000002 HC RX W HCPCS: Performed by: NURSE PRACTITIONER

## 2019-08-08 PROCEDURE — 99223 1ST HOSP IP/OBS HIGH 75: CPT | Performed by: INTERNAL MEDICINE

## 2019-08-08 PROCEDURE — 80076 HEPATIC FUNCTION PANEL: CPT

## 2019-08-08 PROCEDURE — 2060000000 HC ICU INTERMEDIATE R&B

## 2019-08-08 PROCEDURE — 83735 ASSAY OF MAGNESIUM: CPT

## 2019-08-08 PROCEDURE — 87040 BLOOD CULTURE FOR BACTERIA: CPT

## 2019-08-08 PROCEDURE — 36415 COLL VENOUS BLD VENIPUNCTURE: CPT

## 2019-08-08 PROCEDURE — 85018 HEMOGLOBIN: CPT

## 2019-08-08 PROCEDURE — 83615 LACTATE (LD) (LDH) ENZYME: CPT

## 2019-08-08 PROCEDURE — 83010 ASSAY OF HAPTOGLOBIN QUANT: CPT

## 2019-08-08 PROCEDURE — 82728 ASSAY OF FERRITIN: CPT

## 2019-08-08 PROCEDURE — 83540 ASSAY OF IRON: CPT

## 2019-08-08 PROCEDURE — 85045 AUTOMATED RETICULOCYTE COUNT: CPT

## 2019-08-08 PROCEDURE — 36430 TRANSFUSION BLD/BLD COMPNT: CPT

## 2019-08-08 PROCEDURE — 83020 HEMOGLOBIN ELECTROPHORESIS: CPT

## 2019-08-08 PROCEDURE — 93010 ELECTROCARDIOGRAM REPORT: CPT | Performed by: INTERNAL MEDICINE

## 2019-08-08 PROCEDURE — 86140 C-REACTIVE PROTEIN: CPT

## 2019-08-08 PROCEDURE — 86900 BLOOD TYPING SEROLOGIC ABO: CPT

## 2019-08-08 PROCEDURE — P9016 RBC LEUKOCYTES REDUCED: HCPCS

## 2019-08-08 PROCEDURE — 80202 ASSAY OF VANCOMYCIN: CPT

## 2019-08-08 RX ORDER — SODIUM CHLORIDE 9 MG/ML
INJECTION, SOLUTION INTRAVENOUS CONTINUOUS
Status: DISCONTINUED | OUTPATIENT
Start: 2019-08-08 | End: 2019-08-10 | Stop reason: HOSPADM

## 2019-08-08 RX ORDER — 0.9 % SODIUM CHLORIDE 0.9 %
250 INTRAVENOUS SOLUTION INTRAVENOUS ONCE
Status: DISCONTINUED | OUTPATIENT
Start: 2019-08-08 | End: 2019-08-10 | Stop reason: HOSPADM

## 2019-08-08 RX ORDER — 0.9 % SODIUM CHLORIDE 0.9 %
250 INTRAVENOUS SOLUTION INTRAVENOUS ONCE
Status: COMPLETED | OUTPATIENT
Start: 2019-08-08 | End: 2019-08-08

## 2019-08-08 RX ORDER — TRAMADOL HYDROCHLORIDE 50 MG/1
50 TABLET ORAL EVERY 4 HOURS PRN
Status: DISCONTINUED | OUTPATIENT
Start: 2019-08-08 | End: 2019-08-10 | Stop reason: HOSPADM

## 2019-08-08 RX ORDER — LISINOPRIL 5 MG/1
5 TABLET ORAL DAILY
Status: DISCONTINUED | OUTPATIENT
Start: 2019-08-08 | End: 2019-08-10 | Stop reason: HOSPADM

## 2019-08-08 RX ORDER — ALBUTEROL SULFATE 2.5 MG/3ML
2.5 SOLUTION RESPIRATORY (INHALATION)
Status: DISCONTINUED | OUTPATIENT
Start: 2019-08-08 | End: 2019-08-08

## 2019-08-08 RX ORDER — ALBUTEROL SULFATE 2.5 MG/3ML
2.5 SOLUTION RESPIRATORY (INHALATION)
Status: DISCONTINUED | OUTPATIENT
Start: 2019-08-08 | End: 2019-08-10 | Stop reason: HOSPADM

## 2019-08-08 RX ORDER — POTASSIUM CHLORIDE 7.45 MG/ML
10 INJECTION INTRAVENOUS PRN
Status: DISCONTINUED | OUTPATIENT
Start: 2019-08-08 | End: 2019-08-10 | Stop reason: HOSPADM

## 2019-08-08 RX ORDER — PANTOPRAZOLE SODIUM 40 MG/1
40 TABLET, DELAYED RELEASE ORAL
Status: DISCONTINUED | OUTPATIENT
Start: 2019-08-08 | End: 2019-08-10 | Stop reason: HOSPADM

## 2019-08-08 RX ORDER — CLINDAMYCIN PHOSPHATE 900 MG/50ML
900 INJECTION INTRAVENOUS EVERY 8 HOURS
Status: DISCONTINUED | OUTPATIENT
Start: 2019-08-08 | End: 2019-08-10 | Stop reason: HOSPADM

## 2019-08-08 RX ORDER — LORAZEPAM 2 MG/ML
1 INJECTION INTRAMUSCULAR ONCE
Status: COMPLETED | OUTPATIENT
Start: 2019-08-08 | End: 2019-08-08

## 2019-08-08 RX ORDER — SODIUM CHLORIDE 0.9 % (FLUSH) 0.9 %
10 SYRINGE (ML) INJECTION PRN
Status: DISCONTINUED | OUTPATIENT
Start: 2019-08-08 | End: 2019-08-10 | Stop reason: HOSPADM

## 2019-08-08 RX ORDER — POTASSIUM CHLORIDE 20 MEQ/1
40 TABLET, EXTENDED RELEASE ORAL PRN
Status: DISCONTINUED | OUTPATIENT
Start: 2019-08-08 | End: 2019-08-10 | Stop reason: HOSPADM

## 2019-08-08 RX ORDER — NICOTINE 21 MG/24HR
1 PATCH, TRANSDERMAL 24 HOURS TRANSDERMAL DAILY PRN
Status: DISCONTINUED | OUTPATIENT
Start: 2019-08-08 | End: 2019-08-10 | Stop reason: HOSPADM

## 2019-08-08 RX ORDER — SODIUM CHLORIDE 0.9 % (FLUSH) 0.9 %
10 SYRINGE (ML) INJECTION EVERY 12 HOURS SCHEDULED
Status: DISCONTINUED | OUTPATIENT
Start: 2019-08-08 | End: 2019-08-10 | Stop reason: HOSPADM

## 2019-08-08 RX ORDER — ONDANSETRON 2 MG/ML
4 INJECTION INTRAMUSCULAR; INTRAVENOUS EVERY 6 HOURS PRN
Status: DISCONTINUED | OUTPATIENT
Start: 2019-08-08 | End: 2019-08-10 | Stop reason: HOSPADM

## 2019-08-08 RX ORDER — IPRATROPIUM BROMIDE AND ALBUTEROL SULFATE 2.5; .5 MG/3ML; MG/3ML
1 SOLUTION RESPIRATORY (INHALATION)
Status: DISCONTINUED | OUTPATIENT
Start: 2019-08-08 | End: 2019-08-08

## 2019-08-08 RX ORDER — POTASSIUM CHLORIDE 20 MEQ/1
40 TABLET, EXTENDED RELEASE ORAL ONCE
Status: COMPLETED | OUTPATIENT
Start: 2019-08-08 | End: 2019-08-08

## 2019-08-08 RX ORDER — ACETAMINOPHEN 325 MG/1
650 TABLET ORAL EVERY 4 HOURS PRN
Status: DISCONTINUED | OUTPATIENT
Start: 2019-08-08 | End: 2019-08-10 | Stop reason: HOSPADM

## 2019-08-08 RX ADMIN — SODIUM CHLORIDE: 9 INJECTION, SOLUTION INTRAVENOUS at 11:37

## 2019-08-08 RX ADMIN — POTASSIUM CHLORIDE 40 MEQ: 20 TABLET, EXTENDED RELEASE ORAL at 14:58

## 2019-08-08 RX ADMIN — PANTOPRAZOLE SODIUM 40 MG: 40 TABLET, DELAYED RELEASE ORAL at 08:43

## 2019-08-08 RX ADMIN — LORAZEPAM 1 MG: 2 INJECTION INTRAMUSCULAR; INTRAVENOUS at 00:45

## 2019-08-08 RX ADMIN — TRAMADOL HYDROCHLORIDE 50 MG: 50 TABLET, FILM COATED ORAL at 19:56

## 2019-08-08 RX ADMIN — BENZOCAINE, MENTHOL 1 LOZENGE: 15; 3.6 LOZENGE ORAL at 10:09

## 2019-08-08 RX ADMIN — Medication 1250 MG: at 03:39

## 2019-08-08 RX ADMIN — ACETAMINOPHEN 650 MG: 325 TABLET ORAL at 00:31

## 2019-08-08 RX ADMIN — TRAMADOL HYDROCHLORIDE 50 MG: 50 TABLET, FILM COATED ORAL at 15:46

## 2019-08-08 RX ADMIN — ACETAMINOPHEN 650 MG: 325 TABLET ORAL at 17:51

## 2019-08-08 RX ADMIN — ACETAMINOPHEN 650 MG: 325 TABLET ORAL at 05:08

## 2019-08-08 RX ADMIN — Medication 1250 MG: at 12:01

## 2019-08-08 RX ADMIN — BENZOCAINE, MENTHOL 1 LOZENGE: 15; 3.6 LOZENGE ORAL at 15:04

## 2019-08-08 RX ADMIN — POTASSIUM CHLORIDE 40 MEQ: 20 TABLET, EXTENDED RELEASE ORAL at 10:11

## 2019-08-08 RX ADMIN — SODIUM CHLORIDE 250 ML: 9 INJECTION, SOLUTION INTRAVENOUS at 17:52

## 2019-08-08 RX ADMIN — CLINDAMYCIN IN 5 PERCENT DEXTROSE 900 MG: 18 INJECTION, SOLUTION INTRAVENOUS at 14:59

## 2019-08-08 RX ADMIN — SODIUM CHLORIDE: 9 INJECTION, SOLUTION INTRAVENOUS at 00:27

## 2019-08-08 RX ADMIN — LISINOPRIL 5 MG: 5 TABLET ORAL at 08:42

## 2019-08-08 ASSESSMENT — PAIN SCALES - GENERAL
PAINLEVEL_OUTOF10: 0
PAINLEVEL_OUTOF10: 7
PAINLEVEL_OUTOF10: 9

## 2019-08-08 ASSESSMENT — PAIN DESCRIPTION - LOCATION
LOCATION: THROAT
LOCATION: GENERALIZED
LOCATION: THROAT

## 2019-08-08 ASSESSMENT — PAIN DESCRIPTION - DESCRIPTORS
DESCRIPTORS: ACHING
DESCRIPTORS: ACHING;DISCOMFORT

## 2019-08-08 ASSESSMENT — PAIN DESCRIPTION - PAIN TYPE
TYPE: ACUTE PAIN

## 2019-08-08 ASSESSMENT — PAIN DESCRIPTION - FREQUENCY
FREQUENCY: CONTINUOUS
FREQUENCY: CONTINUOUS

## 2019-08-08 ASSESSMENT — PAIN DESCRIPTION - ONSET: ONSET: ON-GOING

## 2019-08-08 NOTE — PROGRESS NOTES
Physical Therapy  DATE: 2019    NAME: George King  MRN: [id number]   : 1980    Patient not seen this date for Physical Therapy due to:  [x] Blood transfusion in progress  [] Hemodialysis  []  Patient Declined  [] Spine Precautions   [] Strict Bedrest  [] Surgery/ Procedure  [] Testing      [] Other        [] PT being discontinued at this time. Patient independent. No further needs. [] PT being discontinued at this time as the patient has been transferred to palliative care. No further needs.     May Avila, PT

## 2019-08-08 NOTE — H&P
(PRINIVIL;ZESTRIL) 5 MG tablet Take 1 tablet by mouth daily 7/9/18   Jeraline [place], APRN - CNP   ibuprofen (ADVIL;MOTRIN) 800 MG tablet Take 1 tablet by mouth every 8 hours as needed for Pain 1/8/18   Jana Harper MD   Menthol (CEPACOL SORE THROAT) 5.4 MG LOZG Use as needed 1/8/18   Jana Harper MD   omeprazole (PRILOSEC) 10 MG delayed release capsule Take 1 capsule by mouth daily 2/3/17   Kathy Leon MD   beclomethasone (QVAR) 80 MCG/ACT inhaler Inhale 2 puffs into the lungs 2 times daily    Historical Provider, MD        Allergies:     Patient has no known allergies. Social History:     Tobacco:    reports that she has been smoking cigarettes. She has a 3.90 pack-year smoking history. She has never used smokeless tobacco.  Alcohol:      reports that she drinks alcohol. Drug Use:  reports that she does not use drugs. Family History:     Family History   Problem Relation Age of Onset    High Blood Pressure Mother     High Blood Pressure Maternal Grandmother        Review of Systems:     Positive and Negative as described in HPI. CONSTITUTIONAL: Positive for myalgias, chills; negative for fevers, sweats, weight loss  HEENT:  negative for vision, hearing changes, runny nose, throat pain  RESPIRATORY: Reports chronic shortness of breath; congestion negative for wheezing.   CARDIOVASCULAR:  negative for chest pain, palpitations  GASTROINTESTINAL: Reports diarrhea, nausea; negative for nausea, constipation, change in bowel habits, abdominal pain   GENITOURINARY:  negative for difficulty of urination, burning with urination, frequency   INTEGUMENT:  negative for rash, skin lesions, easy bruising   HEMATOLOGIC/LYMPHATIC:  negative for swelling/edema   ALLERGIC/IMMUNOLOGIC:  negative for urticaria , itching  ENDOCRINE:  negative increase in drinking, increase in urination, hot or cold intolerance  MUSCULOSKELETAL:  negative joint pains, muscle aches, swelling of joints  NEUROLOGICAL:  negative for headaches, dizziness, lightheadedness, numbness, pain, tingling extremities  BEHAVIOR/PSYCH:  negative for depression, anxiety    Physical Exam:   BP (!) 143/87   Pulse 123   Temp 99.9 °F (37.7 °C) (Oral)   Resp 23   Ht 5' 5\" (1.651 m)   Wt 185 lb 3.2 oz (84 kg)   LMP 2019   SpO2 98%   BMI 30.82 kg/m²   Temp (24hrs), Av.8 °F (38.2 °C), Min:99.4 °F (37.4 °C), Max:103.3 °F (39.6 °C)    Recent Labs     19  1757   POCGLU 133*       Intake/Output Summary (Last 24 hours) at 2019 1346  Last data filed at 2019 0848  Gross per 24 hour   Intake 1789 ml   Output --   Net 1789 ml       General Appearance:  alert, well appearing, and in mild acute distress  Mental status: A&Ox3 with normal affect  Head:  normocephalic, atraumatic. Eye: no icterus, redness, pupils equal and reactive, extraocular eye movements intact, conjunctiva clear  Ear: normal external ear, no discharge, hearing intact  Nose:  + congestion  Mouth: mucous membranes moist, erythematous pharynx  Neck: supple, no carotid bruits, thyroid not palpable  Lungs: Bilateral equal air entry, clear to ausculation, no wheezing, rales or rhonchi, normal effort  Cardiovascular: Tachycardia, regular rhythm, no murmur, gallop, rub. Abdomen: Soft, nontender, nondistended, normal bowel sounds, no hepatomegaly or splenomegaly  Neurologic: There are no new focal motor or sensory deficits, normal muscle tone and bulk, no abnormal sensation, normal speech, cranial nerves II through XII grossly intact  Skin: No gross lesions, rashes, bruising or bleeding on exposed skin area  Extremities:  peripheral pulses palpable, no pedal edema or calf pain with palpation  Psych: normal affect    Investigations:      Laboratory Testing:  Recent Results (from the past 24 hour(s))   Culture Blood #1    Collection Time: 19  5:25 PM   Result Value Ref Range    Specimen Description . BLOOD     Special Requests  LAC 10 ML     Culture NO GROWTH 10 HOURS    Venous Collection Time: 08/07/19  6:04 PM   Result Value Ref Range    Protime 10.8 9.0 - 12.0 sec    INR 1.0    Lactate, Sepsis    Collection Time: 08/07/19  6:04 PM   Result Value Ref Range    Lactic Acid, Sepsis NOT REPORTED 0.5 - 1.9 mmol/L    Lactic Acid, Sepsis, Whole Blood 2.5 (H) 0.5 - 1.9 mmol/L   Comprehensive Metabolic Panel    Collection Time: 08/07/19  6:04 PM   Result Value Ref Range    Glucose 124 (H) 70 - 99 mg/dL    BUN 4 (L) 6 - 20 mg/dL    CREATININE 0.52 0.50 - 0.90 mg/dL    Bun/Cre Ratio NOT REPORTED 9 - 20    Calcium 9.3 8.6 - 10.4 mg/dL    Sodium 129 (L) 135 - 144 mmol/L    Potassium 4.1 3.7 - 5.3 mmol/L    Chloride 95 (L) 98 - 107 mmol/L    CO2 19 (L) 20 - 31 mmol/L    Anion Gap 15 9 - 17 mmol/L    Alkaline Phosphatase 62 35 - 104 U/L    ALT 17 5 - 33 U/L    AST 60 (H) <32 U/L    Total Bilirubin 0.69 0.3 - 1.2 mg/dL    Total Protein 9.1 (H) 6.4 - 8.3 g/dL    Alb 4.2 3.5 - 5.2 g/dL    Albumin/Globulin Ratio 0.9 (L) 1.0 - 2.5    GFR Non-African American >60 >60 mL/min    GFR African American >60 >60 mL/min    GFR Comment          GFR Staging NOT REPORTED    CBC Auto Differential    Collection Time: 08/07/19  6:04 PM   Result Value Ref Range    WBC 20.7 (H) 3.5 - 11.3 k/uL    RBC 4.99 3.95 - 5.11 m/uL    Hemoglobin 7.6 (L) 11.9 - 15.1 g/dL    Hematocrit 29.5 (L) 36.3 - 47.1 %    MCV 59.1 (L) 82.6 - 102.9 fL    MCH 15.2 (L) 25.2 - 33.5 pg    MCHC 25.8 (L) 28.4 - 34.8 g/dL    RDW 22.4 (H) 11.8 - 14.4 %    Platelets See Reflexed IPF Result 138 - 453 k/uL    MPV NOT REPORTED 8.1 - 13.5 fL    NRBC Automated 0.0 0.0 per 100 WBC    Differential Type NOT REPORTED     WBC Morphology NOT REPORTED     RBC Morphology NOT REPORTED     Platelet Estimate NOT REPORTED     Immature Granulocytes 0 0 %    Seg Neutrophils 83 (H) 36 - 66 %    Lymphocytes 7 (L) 24 - 44 %    Atypical Lymphocytes 1 %    Monocytes 9 (H) 1 - 7 %    Eosinophils % 0 (L) 1 - 4 %    Basophils 0 0 - 2 %    Absolute Immature Granulocyte 0.00 0.00 - Range    Specimen Description . BLOOD     Special Requests  UNKNOWN DRAWSITE/VOLUME     Culture NO GROWTH 10 HOURS    RAPID INFLUENZA A/B ANTIGENS    Collection Time: 08/07/19  6:27 PM   Result Value Ref Range    Specimen Description . NASOPHARYNGEAL SWAB     Special Requests NOT REPORTED     Direct Exam       PRESUMPTIVE NEGATIVE for Influenza A + B antigens. PCR testing to confirm this result is available upon request.  Specimen will be saved in the laboratory for 7 days. Please call [phone] if PCR testing is indicated. EKG 12 Lead    Collection Time: 08/07/19  6:42 PM   Result Value Ref Range    Ventricular Rate 108 BPM    Atrial Rate 108 BPM    P-R Interval 144 ms    QRS Duration 86 ms    Q-T Interval 334 ms    QTc Calculation (Bazett) 447 ms    P Axis 61 degrees    R Axis 77 degrees    T Axis 64 degrees   TYPE AND SCREEN    Collection Time: 08/07/19  6:45 PM   Result Value Ref Range    Expiration Date 08/10/2019     Arm Band Number [id number]     ABO/Rh B POSITIVE     Antibody Screen NEGATIVE     Unit Number [id number]     Product Code Leukocyte Reduced Red Cell     Unit Divison 00     Dispense Status ALLOCATED     Transfusion Status OK TO TRANSFUSE     Crossmatch Result COMPATIBLE     Unit Number [id number]     Product Code Leukocyte Reduced Red Cell     Unit Divison 00     Dispense Status ALLOCATED     Transfusion Status OK TO TRANSFUSE     Crossmatch Result COMPATIBLE    STREP SCREEN GROUP A THROAT    Collection Time: 08/07/19  7:12 PM   Result Value Ref Range    Specimen Description . THROAT     Special Requests NOT REPORTED     Direct Exam POSITIVE for Group A Streptococci (A)    Troponin    Collection Time: 08/07/19  8:14 PM   Result Value Ref Range    Troponin, High Sensitivity 9 0 - 14 ng/L    Troponin T NOT REPORTED <0.03 ng/mL    Troponin Interp NOT REPORTED    SPECIMEN REJECTION    Collection Time: 08/07/19  8:14 PM   Result Value Ref Range    Specimen Source

## 2019-08-08 NOTE — PROGRESS NOTES
Occupational Therapy Not Seen Note    DATE: 2019  Name: Sophie España  : 1980  MRN: [id number]    Patient not available for Occupational Therapy due to: Other: Pt HGB is 7.1. Receiving blood.       Next Scheduled Treatment: Check back PM as able or 2019    Electronically signed by ANNALEE Sanchez on 2019 at 9:55 AM

## 2019-08-08 NOTE — CARE COORDINATION
Consult received d/t drug use (crack)  Attempted to meet with pt - pt stated she was not feeling well  Will f/u with pt tomorrow

## 2019-08-08 NOTE — PROGRESS NOTES
Patient's potassium 2.8 and Hbg 7.1. Dr. Larisa Tejada notified via perfect serve. Awaiting orders. Continue to monitor.

## 2019-08-08 NOTE — PROGRESS NOTES
[facility] Strep [facility] [J02.0] . Vitals:    08/08/19 0013   BP: (!) 156/88   Pulse:    Resp:    Temp:    SpO2:    . Patients home meds are   Prior to Admission medications    Medication Sig Start Date End Date Taking?  Authorizing Provider   lisinopril (PRINIVIL;ZESTRIL) 5 MG tablet Take 1 tablet by mouth daily 7/9/18   ORACIO Chirinos CNP   ibuprofen (ADVIL;MOTRIN) 800 MG tablet Take 1 tablet by mouth every 8 hours as needed for Pain 1/8/18   Eva Dykes MD   Menthol (CEPACOL SORE THROAT) 5.4 MG LOZG Use as needed 1/8/18   Eva Dykes MD   omeprazole (PRILOSEC) 10 MG delayed release capsule Take 1 capsule by mouth daily 2/3/17   Lazaro Matthews MD   beclomethasone (QVAR) 80 MCG/ACT inhaler Inhale 2 puffs into the lungs 2 times daily    Historical Provider, MD     Assessment Pt has a hx of Asthma, states she takes inhalers at home PRN and that her breathing right now feels baseline      RR 18  Breath Sounds: clear      · Bronchodilator assessment at level  1    · [x]    Bronchodilator Assessment  BRONCHODILATOR ASSESSMENT SCORE  Score 0 1 2 3 4 5   Breath Sounds   []  Patient Baseline [x]  No Wheeze good aeration []  Faint, scattered wheezing, good aeration []  Expiratory Wheezing and or moderately diminished []  Insp/Exp wheeze and/or very diminished []  Insp/Exp and/ or marked distress   Respiratory Rate   []  Patient Baseline [x]  Less than 20 []  Less than 20 []  20-25 []  Greater than 25 []  Greater than 25   Peak flow % of Pred or PB [x]  NA   []  Greater than 90%  []  81-90% []  71-80% []  Less than or equal to 70%  or unable to perform []  Unable due to Respiratory Distress   Dyspnea re []  Patient Baseline [x]  No SOB []  No SOB []  SOB on exertion []  SOB min activity []  At rest/acute   e FEV% Predicted       [x]  NA []  Above 69%  []  Unable []  Above 60-69%  []  Unable []  Above 50-59%  []  Unable []  Above 35-49%  []  Unable []  Less than 35%  []  Ayesha [place]  12:23 AM

## 2019-08-09 LAB
ABO/RH: NORMAL
ABSOLUTE EOS #: 0 K/UL (ref 0–0.4)
ABSOLUTE IMMATURE GRANULOCYTE: 0 K/UL (ref 0–0.3)
ABSOLUTE LYMPH #: 0.74 K/UL (ref 1–4.8)
ABSOLUTE MONO #: 1.97 K/UL (ref 0.1–0.8)
ANION GAP SERPL CALCULATED.3IONS-SCNC: 13 MMOL/L (ref 9–17)
ANTIBODY SCREEN: NEGATIVE
ARM BAND NUMBER: NORMAL
BASOPHILS # BLD: 0 % (ref 0–2)
BASOPHILS ABSOLUTE: 0 K/UL (ref 0–0.2)
BLD PROD TYP BPU: NORMAL
BLD PROD TYP BPU: NORMAL
BUN BLDV-MCNC: 5 MG/DL (ref 6–20)
BUN/CREAT BLD: ABNORMAL (ref 9–20)
CALCIUM SERPL-MCNC: 8.8 MG/DL (ref 8.6–10.4)
CHLORIDE BLD-SCNC: 102 MMOL/L (ref 98–107)
CO2: 19 MMOL/L (ref 20–31)
CREAT SERPL-MCNC: 0.46 MG/DL (ref 0.5–0.9)
CROSSMATCH RESULT: NORMAL
CROSSMATCH RESULT: NORMAL
DIFFERENTIAL TYPE: ABNORMAL
DISPENSE STATUS BLOOD BANK: NORMAL
DISPENSE STATUS BLOOD BANK: NORMAL
EOSINOPHILS RELATIVE PERCENT: 0 % (ref 1–4)
EXPIRATION DATE: NORMAL
GFR AFRICAN AMERICAN: >60 ML/MIN
GFR NON-AFRICAN AMERICAN: >60 ML/MIN
GFR SERPL CREATININE-BSD FRML MDRD: ABNORMAL ML/MIN/{1.73_M2}
GFR SERPL CREATININE-BSD FRML MDRD: ABNORMAL ML/MIN/{1.73_M2}
GLUCOSE BLD-MCNC: 134 MG/DL (ref 70–99)
HCT VFR BLD CALC: 32.7 % (ref 36.3–47.1)
HEMOGLOBIN: 8.5 G/DL (ref 11.9–15.1)
IMMATURE GRANULOCYTES: 0 %
LACTIC ACID, WHOLE BLOOD: 0.4 MMOL/L (ref 0.7–2.1)
LACTIC ACID: ABNORMAL MMOL/L
LYMPHOCYTES # BLD: 3 % (ref 24–44)
MAGNESIUM: 2 MG/DL (ref 1.6–2.6)
MCH RBC QN AUTO: 17.3 PG (ref 25.2–33.5)
MCHC RBC AUTO-ENTMCNC: 26 G/DL (ref 28.4–34.8)
MCV RBC AUTO: 66.7 FL (ref 82.6–102.9)
MONOCYTES # BLD: 8 % (ref 1–7)
MORPHOLOGY: ABNORMAL
NRBC AUTOMATED: 0 PER 100 WBC
PDW BLD-RTO: 26.8 % (ref 11.8–14.4)
PLATELET # BLD: ABNORMAL K/UL (ref 138–453)
PLATELET ESTIMATE: ABNORMAL
PLATELET, FLUORESCENCE: 236 K/UL (ref 138–453)
PLATELET, IMMATURE FRACTION: 4.4 % (ref 1.1–10.3)
PMV BLD AUTO: ABNORMAL FL (ref 8.1–13.5)
POTASSIUM SERPL-SCNC: 3.4 MMOL/L (ref 3.7–5.3)
RBC # BLD: 4.9 M/UL (ref 3.95–5.11)
RBC # BLD: ABNORMAL 10*6/UL
SEG NEUTROPHILS: 89 % (ref 36–66)
SEGMENTED NEUTROPHILS ABSOLUTE COUNT: 21.89 K/UL (ref 1.8–7.7)
SODIUM BLD-SCNC: 134 MMOL/L (ref 135–144)
TRANSFUSION STATUS: NORMAL
TRANSFUSION STATUS: NORMAL
UNIT DIVISION: 0
UNIT DIVISION: 0
UNIT NUMBER: NORMAL
UNIT NUMBER: NORMAL
WBC # BLD: 24.6 K/UL (ref 3.5–11.3)
WBC # BLD: ABNORMAL 10*3/UL

## 2019-08-09 PROCEDURE — 2500000003 HC RX 250 WO HCPCS: Performed by: INTERNAL MEDICINE

## 2019-08-09 PROCEDURE — 2580000003 HC RX 258: Performed by: NURSE PRACTITIONER

## 2019-08-09 PROCEDURE — 6370000000 HC RX 637 (ALT 250 FOR IP): Performed by: NURSE PRACTITIONER

## 2019-08-09 PROCEDURE — 2060000000 HC ICU INTERMEDIATE R&B

## 2019-08-09 PROCEDURE — 85025 COMPLETE CBC W/AUTO DIFF WBC: CPT

## 2019-08-09 PROCEDURE — 99232 SBSQ HOSP IP/OBS MODERATE 35: CPT | Performed by: INTERNAL MEDICINE

## 2019-08-09 PROCEDURE — 97530 THERAPEUTIC ACTIVITIES: CPT

## 2019-08-09 PROCEDURE — 6370000000 HC RX 637 (ALT 250 FOR IP): Performed by: INTERNAL MEDICINE

## 2019-08-09 PROCEDURE — 80048 BASIC METABOLIC PNL TOTAL CA: CPT

## 2019-08-09 PROCEDURE — 6360000002 HC RX W HCPCS: Performed by: NURSE PRACTITIONER

## 2019-08-09 PROCEDURE — 97162 PT EVAL MOD COMPLEX 30 MIN: CPT

## 2019-08-09 PROCEDURE — 85055 RETICULATED PLATELET ASSAY: CPT

## 2019-08-09 PROCEDURE — 36415 COLL VENOUS BLD VENIPUNCTURE: CPT

## 2019-08-09 PROCEDURE — 83735 ASSAY OF MAGNESIUM: CPT

## 2019-08-09 RX ADMIN — SODIUM CHLORIDE: 9 INJECTION, SOLUTION INTRAVENOUS at 10:41

## 2019-08-09 RX ADMIN — CLINDAMYCIN IN 5 PERCENT DEXTROSE 900 MG: 18 INJECTION, SOLUTION INTRAVENOUS at 10:41

## 2019-08-09 RX ADMIN — VANCOMYCIN HYDROCHLORIDE 1500 MG: 10 INJECTION, POWDER, LYOPHILIZED, FOR SOLUTION INTRAVENOUS at 08:13

## 2019-08-09 RX ADMIN — CLINDAMYCIN IN 5 PERCENT DEXTROSE 900 MG: 18 INJECTION, SOLUTION INTRAVENOUS at 02:26

## 2019-08-09 RX ADMIN — VANCOMYCIN HYDROCHLORIDE 1500 MG: 10 INJECTION, POWDER, LYOPHILIZED, FOR SOLUTION INTRAVENOUS at 16:26

## 2019-08-09 RX ADMIN — LISINOPRIL 5 MG: 5 TABLET ORAL at 10:41

## 2019-08-09 RX ADMIN — ENOXAPARIN SODIUM 40 MG: 40 INJECTION SUBCUTANEOUS at 08:13

## 2019-08-09 RX ADMIN — TRAMADOL HYDROCHLORIDE 50 MG: 50 TABLET, FILM COATED ORAL at 15:46

## 2019-08-09 RX ADMIN — TRAMADOL HYDROCHLORIDE 50 MG: 50 TABLET, FILM COATED ORAL at 10:19

## 2019-08-09 RX ADMIN — PANTOPRAZOLE SODIUM 40 MG: 40 TABLET, DELAYED RELEASE ORAL at 10:41

## 2019-08-09 RX ADMIN — VANCOMYCIN HYDROCHLORIDE 1500 MG: 10 INJECTION, POWDER, LYOPHILIZED, FOR SOLUTION INTRAVENOUS at 00:24

## 2019-08-09 RX ADMIN — TRAMADOL HYDROCHLORIDE 50 MG: 50 TABLET, FILM COATED ORAL at 05:52

## 2019-08-09 RX ADMIN — TRAMADOL HYDROCHLORIDE 50 MG: 50 TABLET, FILM COATED ORAL at 01:41

## 2019-08-09 RX ADMIN — VANCOMYCIN HYDROCHLORIDE 1500 MG: 10 INJECTION, POWDER, LYOPHILIZED, FOR SOLUTION INTRAVENOUS at 23:52

## 2019-08-09 RX ADMIN — CLINDAMYCIN IN 5 PERCENT DEXTROSE 900 MG: 18 INJECTION, SOLUTION INTRAVENOUS at 19:03

## 2019-08-09 RX ADMIN — POTASSIUM CHLORIDE 40 MEQ: 20 TABLET, EXTENDED RELEASE ORAL at 10:41

## 2019-08-09 RX ADMIN — BENZOCAINE, MENTHOL 1 LOZENGE: 15; 3.6 LOZENGE ORAL at 05:30

## 2019-08-09 ASSESSMENT — PAIN DESCRIPTION - LOCATION
LOCATION: ABDOMEN
LOCATION: HEAD
LOCATION: ABDOMEN
LOCATION: THROAT

## 2019-08-09 ASSESSMENT — PAIN DESCRIPTION - DESCRIPTORS
DESCRIPTORS: DISCOMFORT
DESCRIPTORS: CRAMPING
DESCRIPTORS: DISCOMFORT
DESCRIPTORS: DISCOMFORT;CRAMPING
DESCRIPTORS: CRAMPING;DISCOMFORT

## 2019-08-09 ASSESSMENT — PAIN DESCRIPTION - PAIN TYPE
TYPE: OTHER (COMMENT)
TYPE: ACUTE PAIN

## 2019-08-09 ASSESSMENT — PAIN DESCRIPTION - FREQUENCY
FREQUENCY: INTERMITTENT
FREQUENCY: CONTINUOUS
FREQUENCY: INTERMITTENT
FREQUENCY: INTERMITTENT

## 2019-08-09 ASSESSMENT — PAIN SCALES - WONG BAKER
WONGBAKER_NUMERICALRESPONSE: 0
WONGBAKER_NUMERICALRESPONSE: 2
WONGBAKER_NUMERICALRESPONSE: 0
WONGBAKER_NUMERICALRESPONSE: 0

## 2019-08-09 ASSESSMENT — PAIN SCALES - GENERAL
PAINLEVEL_OUTOF10: 5
PAINLEVEL_OUTOF10: 9
PAINLEVEL_OUTOF10: 6
PAINLEVEL_OUTOF10: 0
PAINLEVEL_OUTOF10: 6
PAINLEVEL_OUTOF10: 6
PAINLEVEL_OUTOF10: 8

## 2019-08-09 ASSESSMENT — PAIN DESCRIPTION - ONSET: ONSET: ON-GOING

## 2019-08-09 NOTE — PLAN OF CARE
Problem: Falls - Risk of:  Goal: Will remain free from falls  Description  Will remain free from falls  Outcome: Met This Shift  Goal: Absence of physical injury  Description  Absence of physical injury  Outcome: Met This Shift    Pt remained free of falls this shift. Bed remains in lowest position with 2/4 side rails up and all wheels locked. Bedside table and call light within reach. Pt calls out appropriately. Will continue to monitor.       Electronically signed by Josselyn Saleh RN on 8/9/2019 at 7:00 PM

## 2019-08-09 NOTE — PROGRESS NOTES
No  Follows Commands: Within Functional Limits  Subjective  Subjective: RN and pt in agreement for PT eval; pt in restroom with nursing staff upon writer's arrival, pleasant and cooperative throughout   Pain Screening  Patient Currently in Pain: Yes  Pain Assessment  Pain Assessment: Faces  [place]Baker Pain Rating: Hurts a little bit  Pain Type: Acute pain  Pain Location: Head  Pain Descriptors: Discomfort  Non-Pharmaceutical Pain Intervention(s): Ambulation/Increased Activity;Repositioned  Response to Pain Intervention: Patient Satisfied  Multiple Pain Sites: No  Vital Signs  Patient Currently in Pain: Yes       Orientation  Orientation  Overall Orientation Status: Within Functional Limits  Social/Functional History  Social/Functional History  Lives With: Friend(s)  Type of Home: House  Home Layout: One level  Home Access: Stairs to enter with rails  Entrance Stairs - Number of Steps: 7  Entrance Stairs - Rails: Right  Bathroom Shower/Tub: Tub/Shower unit  Bathroom Toilet: Standard  ADL Assistance: Independent  Homemaking Assistance: Independent  Homemaking Responsibilities: Yes  Ambulation Assistance: Independent(Pt reports ambulating without an AD at baseline)  Transfer Assistance: Independent  Active : No  Mode of Transportation: Friends  Occupation: On disability  Additional Comments: Pt reports roommates can provide assist prn.    Cognition   Cognition  Overall Cognitive Status: WFL    Objective          Joint Mobility  Spine: WFL  ROM RLE: WFL  ROM LLE: WFL  ROM RUE: WFL  ROM LUE: WFL  Strength RLE  Strength RLE: Exception  Comment: Grossly 4+/5 at hip, knee, and ankle  Strength LLE  Comment: Grossly 4+/5 at hip, knee, and ankle  Motor Control  Gross Motor?: WFL  Sensation  Overall Sensation Status: WFL(Pt denies numbness/ tingling)  Bed mobility  Comment: FARHANA- pt in restroom with nursing staff upon writer's arrival, retired in bedside chair upon writer's exit  Transfers  Sit to Stand: Contact guard

## 2019-08-09 NOTE — PROGRESS NOTES
Called pharmacy to see if it was okay to give scheduled dose of Vanco this morning as last trough was drawn yesterday evening. Pharmacy said dose was increased to 1500mg and it is okay to give morning dose. Pharmacy will follow up with me regarding this evenings dose.     Electronically signed by Satish Campos RN on 8/9/2019 at 7:57 AM

## 2019-08-10 VITALS
OXYGEN SATURATION: 100 % | WEIGHT: 188 LBS | HEART RATE: 65 BPM | TEMPERATURE: 97.3 F | RESPIRATION RATE: 25 BRPM | SYSTOLIC BLOOD PRESSURE: 141 MMHG | BODY MASS INDEX: 31.32 KG/M2 | DIASTOLIC BLOOD PRESSURE: 80 MMHG | HEIGHT: 65 IN

## 2019-08-10 PROBLEM — D72.9 NEUTROPHILIC LEUKOCYTOSIS: Status: RESOLVED | Noted: 2019-08-08 | Resolved: 2019-08-10

## 2019-08-10 PROBLEM — E87.1 HYPONATREMIA: Status: RESOLVED | Noted: 2019-08-08 | Resolved: 2019-08-10

## 2019-08-10 LAB
ABSOLUTE EOS #: 0.19 K/UL (ref 0–0.4)
ABSOLUTE IMMATURE GRANULOCYTE: 0 K/UL (ref 0–0.3)
ABSOLUTE LYMPH #: 1.79 K/UL (ref 1–4.8)
ABSOLUTE MONO #: 1.13 K/UL (ref 0.1–0.8)
ANION GAP SERPL CALCULATED.3IONS-SCNC: 12 MMOL/L (ref 9–17)
BASOPHILS # BLD: 0 % (ref 0–2)
BASOPHILS ABSOLUTE: 0 K/UL (ref 0–0.2)
BUN BLDV-MCNC: 7 MG/DL (ref 6–20)
BUN/CREAT BLD: ABNORMAL (ref 9–20)
CALCIUM SERPL-MCNC: 8.5 MG/DL (ref 8.6–10.4)
CHLORIDE BLD-SCNC: 109 MMOL/L (ref 98–107)
CO2: 19 MMOL/L (ref 20–31)
CREAT SERPL-MCNC: 0.49 MG/DL (ref 0.5–0.9)
DIFFERENTIAL TYPE: ABNORMAL
EOSINOPHILS RELATIVE PERCENT: 2 % (ref 1–4)
GFR AFRICAN AMERICAN: >60 ML/MIN
GFR NON-AFRICAN AMERICAN: >60 ML/MIN
GFR SERPL CREATININE-BSD FRML MDRD: ABNORMAL ML/MIN/{1.73_M2}
GFR SERPL CREATININE-BSD FRML MDRD: ABNORMAL ML/MIN/{1.73_M2}
GLUCOSE BLD-MCNC: 94 MG/DL (ref 70–99)
HCT VFR BLD CALC: 32.4 % (ref 36.3–47.1)
HEMOGLOBIN: 8.3 G/DL (ref 11.9–15.1)
IMMATURE GRANULOCYTES: 0 %
LYMPHOCYTES # BLD: 19 % (ref 24–44)
MCH RBC QN AUTO: 17.3 PG (ref 25.2–33.5)
MCHC RBC AUTO-ENTMCNC: 25.6 G/DL (ref 28.4–34.8)
MCV RBC AUTO: 67.4 FL (ref 82.6–102.9)
MONOCYTES # BLD: 12 % (ref 1–7)
MORPHOLOGY: ABNORMAL
NRBC AUTOMATED: 0 PER 100 WBC
PDW BLD-RTO: 27.2 % (ref 11.8–14.4)
PLATELET # BLD: ABNORMAL K/UL (ref 138–453)
PLATELET ESTIMATE: ABNORMAL
PLATELET, FLUORESCENCE: 246 K/UL (ref 138–453)
PLATELET, IMMATURE FRACTION: 5.2 % (ref 1.1–10.3)
PMV BLD AUTO: ABNORMAL FL (ref 8.1–13.5)
POTASSIUM SERPL-SCNC: 3.5 MMOL/L (ref 3.7–5.3)
RBC # BLD: 4.81 M/UL (ref 3.95–5.11)
RBC # BLD: ABNORMAL 10*6/UL
SEG NEUTROPHILS: 67 % (ref 36–66)
SEGMENTED NEUTROPHILS ABSOLUTE COUNT: 6.29 K/UL (ref 1.8–7.7)
SODIUM BLD-SCNC: 140 MMOL/L (ref 135–144)
VANCOMYCIN TROUGH DATE LAST DOSE: NORMAL
VANCOMYCIN TROUGH DOSE AMOUNT: NORMAL
VANCOMYCIN TROUGH TIME LAST DOSE: NORMAL
VANCOMYCIN TROUGH: 14.8 UG/ML (ref 10–20)
WBC # BLD: 9.4 K/UL (ref 3.5–11.3)
WBC # BLD: ABNORMAL 10*3/UL

## 2019-08-10 PROCEDURE — 85025 COMPLETE CBC W/AUTO DIFF WBC: CPT

## 2019-08-10 PROCEDURE — 6370000000 HC RX 637 (ALT 250 FOR IP): Performed by: INTERNAL MEDICINE

## 2019-08-10 PROCEDURE — 2500000003 HC RX 250 WO HCPCS: Performed by: INTERNAL MEDICINE

## 2019-08-10 PROCEDURE — 36415 COLL VENOUS BLD VENIPUNCTURE: CPT

## 2019-08-10 PROCEDURE — 6370000000 HC RX 637 (ALT 250 FOR IP): Performed by: NURSE PRACTITIONER

## 2019-08-10 PROCEDURE — 80048 BASIC METABOLIC PNL TOTAL CA: CPT

## 2019-08-10 PROCEDURE — 2580000003 HC RX 258: Performed by: NURSE PRACTITIONER

## 2019-08-10 PROCEDURE — 6360000002 HC RX W HCPCS: Performed by: NURSE PRACTITIONER

## 2019-08-10 PROCEDURE — 99239 HOSP IP/OBS DSCHRG MGMT >30: CPT | Performed by: INTERNAL MEDICINE

## 2019-08-10 PROCEDURE — 85055 RETICULATED PLATELET ASSAY: CPT

## 2019-08-10 PROCEDURE — 80202 ASSAY OF VANCOMYCIN: CPT

## 2019-08-10 RX ORDER — AMOXICILLIN AND CLAVULANATE POTASSIUM 875; 125 MG/1; MG/1
1 TABLET, FILM COATED ORAL 2 TIMES DAILY
Qty: 14 TABLET | Refills: 0 | Status: SHIPPED | OUTPATIENT
Start: 2019-08-10 | End: 2019-08-17

## 2019-08-10 RX ORDER — FERROUS SULFATE 325(65) MG
325 TABLET ORAL
Qty: 30 TABLET | Refills: 0 | Status: SHIPPED | OUTPATIENT
Start: 2019-08-10

## 2019-08-10 RX ADMIN — CLINDAMYCIN IN 5 PERCENT DEXTROSE 900 MG: 18 INJECTION, SOLUTION INTRAVENOUS at 03:09

## 2019-08-10 RX ADMIN — BENZOCAINE, MENTHOL 1 LOZENGE: 15; 3.6 LOZENGE ORAL at 10:09

## 2019-08-10 RX ADMIN — PANTOPRAZOLE SODIUM 40 MG: 40 TABLET, DELAYED RELEASE ORAL at 08:22

## 2019-08-10 RX ADMIN — VANCOMYCIN HYDROCHLORIDE 1500 MG: 10 INJECTION, POWDER, LYOPHILIZED, FOR SOLUTION INTRAVENOUS at 08:17

## 2019-08-10 RX ADMIN — ACETAMINOPHEN 650 MG: 325 TABLET ORAL at 08:28

## 2019-08-10 RX ADMIN — CLINDAMYCIN IN 5 PERCENT DEXTROSE 900 MG: 18 INJECTION, SOLUTION INTRAVENOUS at 10:00

## 2019-08-10 RX ADMIN — ACETAMINOPHEN 650 MG: 325 TABLET ORAL at 03:13

## 2019-08-10 RX ADMIN — ENOXAPARIN SODIUM 40 MG: 40 INJECTION SUBCUTANEOUS at 08:31

## 2019-08-10 RX ADMIN — Medication 10 ML: at 08:17

## 2019-08-10 ASSESSMENT — PAIN SCALES - GENERAL
PAINLEVEL_OUTOF10: 10
PAINLEVEL_OUTOF10: 5
PAINLEVEL_OUTOF10: 10

## 2019-08-10 ASSESSMENT — PAIN DESCRIPTION - DESCRIPTORS: DESCRIPTORS: CRAMPING

## 2019-08-10 ASSESSMENT — PAIN DESCRIPTION - ONSET: ONSET: ON-GOING

## 2019-08-10 ASSESSMENT — PAIN DESCRIPTION - PAIN TYPE: TYPE: ACUTE PAIN

## 2019-08-10 ASSESSMENT — PAIN SCALES - WONG BAKER: WONGBAKER_NUMERICALRESPONSE: 0

## 2019-08-10 ASSESSMENT — PAIN DESCRIPTION - FREQUENCY: FREQUENCY: INTERMITTENT

## 2019-08-10 ASSESSMENT — PAIN DESCRIPTION - LOCATION: LOCATION: ABDOMEN

## 2019-08-10 NOTE — PROGRESS NOTES
1420 IV, Tele discontinued, patient showered, verbalized understanding of discharge instructions. To follow up with PCP & OBGYN. Transported to main lobby via Wheelchair. Picked up by family member, to  new meds from pharmacy. Denies any concerns.

## 2019-08-10 NOTE — DISCHARGE INSTR - DIET

## 2019-08-13 LAB
CULTURE: NORMAL
CULTURE: NORMAL
HGB ELECTROPHORESIS INTERP: NORMAL
Lab: NORMAL
Lab: NORMAL
PATHOLOGIST: NORMAL
SPECIMEN DESCRIPTION: NORMAL
SPECIMEN DESCRIPTION: NORMAL

## 2019-08-14 LAB
CULTURE: NORMAL
CULTURE: NORMAL
Lab: NORMAL
Lab: NORMAL
SPECIMEN DESCRIPTION: NORMAL
SPECIMEN DESCRIPTION: NORMAL

## 2019-12-15 ENCOUNTER — HOSPITAL ENCOUNTER (EMERGENCY)
Age: 39
Discharge: HOME | End: 2019-12-16
Attending: EMERGENCY MEDICINE
Payer: MEDICARE

## 2019-12-15 VITALS
HEIGHT: 65 IN | SYSTOLIC BLOOD PRESSURE: 144 MMHG | BODY MASS INDEX: 33.32 KG/M2 | HEART RATE: 97 BPM | OXYGEN SATURATION: 100 % | DIASTOLIC BLOOD PRESSURE: 106 MMHG | WEIGHT: 200 LBS | TEMPERATURE: 98.8 F | RESPIRATION RATE: 22 BRPM

## 2019-12-15 DIAGNOSIS — N30.01 ACUTE CYSTITIS WITH HEMATURIA: ICD-10-CM

## 2019-12-15 DIAGNOSIS — F41.1 ANXIETY STATE: ICD-10-CM

## 2019-12-15 DIAGNOSIS — N92.0 MENORRHAGIA WITH REGULAR CYCLE: Primary | ICD-10-CM

## 2019-12-15 LAB
ABSOLUTE EOS #: 0.05 K/UL (ref 0–0.44)
ABSOLUTE IMMATURE GRANULOCYTE: <0.03 K/UL (ref 0–0.3)
ABSOLUTE LYMPH #: 2.27 K/UL (ref 1.1–3.7)
ABSOLUTE MONO #: 0.93 K/UL (ref 0.1–1.2)
ALBUMIN SERPL-MCNC: 4.1 G/DL (ref 3.5–5.2)
ALBUMIN/GLOBULIN RATIO: 0.9 (ref 1–2.5)
ALP BLD-CCNC: 64 U/L (ref 35–104)
ALT SERPL-CCNC: 13 U/L (ref 5–33)
ANION GAP SERPL CALCULATED.3IONS-SCNC: 14 MMOL/L (ref 9–17)
AST SERPL-CCNC: 23 U/L
BASOPHILS # BLD: 0 % (ref 0–2)
BASOPHILS ABSOLUTE: 0.04 K/UL (ref 0–0.2)
BILIRUB SERPL-MCNC: 0.41 MG/DL (ref 0.3–1.2)
BUN BLDV-MCNC: 7 MG/DL (ref 6–20)
BUN/CREAT BLD: ABNORMAL (ref 9–20)
CALCIUM SERPL-MCNC: 9.2 MG/DL (ref 8.6–10.4)
CHLORIDE BLD-SCNC: 104 MMOL/L (ref 98–107)
CO2: 20 MMOL/L (ref 20–31)
CREAT SERPL-MCNC: 0.56 MG/DL (ref 0.5–0.9)
DIFFERENTIAL TYPE: ABNORMAL
DIRECT EXAM: NORMAL
EOSINOPHILS RELATIVE PERCENT: 1 % (ref 1–4)
GFR AFRICAN AMERICAN: >60 ML/MIN
GFR NON-AFRICAN AMERICAN: >60 ML/MIN
GFR SERPL CREATININE-BSD FRML MDRD: ABNORMAL ML/MIN/{1.73_M2}
GFR SERPL CREATININE-BSD FRML MDRD: ABNORMAL ML/MIN/{1.73_M2}
GLUCOSE BLD-MCNC: 87 MG/DL (ref 70–99)
HCG QUALITATIVE: NEGATIVE
HCT VFR BLD CALC: 32.2 % (ref 36.3–47.1)
HEMOGLOBIN: 9.2 G/DL (ref 11.9–15.1)
IMMATURE GRANULOCYTES: 0 %
LIPASE: 17 U/L (ref 13–60)
LYMPHOCYTES # BLD: 25 % (ref 24–43)
Lab: NORMAL
MCH RBC QN AUTO: 21 PG (ref 25.2–33.5)
MCHC RBC AUTO-ENTMCNC: 28.6 G/DL (ref 28.4–34.8)
MCV RBC AUTO: 73.5 FL (ref 82.6–102.9)
MONOCYTES # BLD: 10 % (ref 3–12)
NRBC AUTOMATED: 0 PER 100 WBC
PDW BLD-RTO: 18.7 % (ref 11.8–14.4)
PLATELET # BLD: 330 K/UL (ref 138–453)
PLATELET ESTIMATE: ABNORMAL
PMV BLD AUTO: 11 FL (ref 8.1–13.5)
POTASSIUM SERPL-SCNC: 3.9 MMOL/L (ref 3.7–5.3)
RBC # BLD: 4.38 M/UL (ref 3.95–5.11)
RBC # BLD: ABNORMAL 10*6/UL
SEG NEUTROPHILS: 64 % (ref 36–65)
SEGMENTED NEUTROPHILS ABSOLUTE COUNT: 5.72 K/UL (ref 1.5–8.1)
SODIUM BLD-SCNC: 138 MMOL/L (ref 135–144)
SPECIMEN DESCRIPTION: NORMAL
TOTAL PROTEIN: 8.5 G/DL (ref 6.4–8.3)
WBC # BLD: 9 K/UL (ref 3.5–11.3)
WBC # BLD: ABNORMAL 10*3/UL

## 2019-12-15 PROCEDURE — 93005 ELECTROCARDIOGRAM TRACING: CPT | Performed by: EMERGENCY MEDICINE

## 2019-12-15 PROCEDURE — 85025 COMPLETE CBC W/AUTO DIFF WBC: CPT

## 2019-12-15 PROCEDURE — 6360000002 HC RX W HCPCS: Performed by: EMERGENCY MEDICINE

## 2019-12-15 PROCEDURE — 99284 EMERGENCY DEPT VISIT MOD MDM: CPT

## 2019-12-15 PROCEDURE — 81001 URINALYSIS AUTO W/SCOPE: CPT

## 2019-12-15 PROCEDURE — 87491 CHLMYD TRACH DNA AMP PROBE: CPT

## 2019-12-15 PROCEDURE — 87480 CANDIDA DNA DIR PROBE: CPT

## 2019-12-15 PROCEDURE — 6370000000 HC RX 637 (ALT 250 FOR IP): Performed by: EMERGENCY MEDICINE

## 2019-12-15 PROCEDURE — 87591 N.GONORRHOEAE DNA AMP PROB: CPT

## 2019-12-15 PROCEDURE — 87510 GARDNER VAG DNA DIR PROBE: CPT

## 2019-12-15 PROCEDURE — 87086 URINE CULTURE/COLONY COUNT: CPT

## 2019-12-15 PROCEDURE — 80053 COMPREHEN METABOLIC PANEL: CPT

## 2019-12-15 PROCEDURE — 96374 THER/PROPH/DIAG INJ IV PUSH: CPT

## 2019-12-15 PROCEDURE — 2580000003 HC RX 258: Performed by: EMERGENCY MEDICINE

## 2019-12-15 PROCEDURE — 87660 TRICHOMONAS VAGIN DIR PROBE: CPT

## 2019-12-15 PROCEDURE — 83690 ASSAY OF LIPASE: CPT

## 2019-12-15 PROCEDURE — 84703 CHORIONIC GONADOTROPIN ASSAY: CPT

## 2019-12-15 RX ORDER — 0.9 % SODIUM CHLORIDE 0.9 %
1000 INTRAVENOUS SOLUTION INTRAVENOUS ONCE
Status: COMPLETED | OUTPATIENT
Start: 2019-12-15 | End: 2019-12-16

## 2019-12-15 RX ORDER — LORAZEPAM 2 MG/ML
1 INJECTION INTRAMUSCULAR ONCE
Status: COMPLETED | OUTPATIENT
Start: 2019-12-15 | End: 2019-12-15

## 2019-12-15 RX ORDER — DICYCLOMINE HYDROCHLORIDE 10 MG/1
10 CAPSULE ORAL ONCE
Status: COMPLETED | OUTPATIENT
Start: 2019-12-15 | End: 2019-12-15

## 2019-12-15 RX ORDER — ACETAMINOPHEN 500 MG
1000 TABLET ORAL ONCE
Status: COMPLETED | OUTPATIENT
Start: 2019-12-15 | End: 2019-12-15

## 2019-12-15 RX ADMIN — ACETAMINOPHEN 1000 MG: 500 TABLET ORAL at 21:15

## 2019-12-15 RX ADMIN — DICYCLOMINE HYDROCHLORIDE 10 MG: 10 CAPSULE ORAL at 21:15

## 2019-12-15 RX ADMIN — SODIUM CHLORIDE 1000 ML: 9 INJECTION, SOLUTION INTRAVENOUS at 21:15

## 2019-12-15 RX ADMIN — LORAZEPAM 1 MG: 2 INJECTION INTRAMUSCULAR; INTRAVENOUS at 21:15

## 2019-12-15 ASSESSMENT — PAIN DESCRIPTION - DESCRIPTORS: DESCRIPTORS: CRAMPING

## 2019-12-15 ASSESSMENT — PAIN SCALES - GENERAL
PAINLEVEL_OUTOF10: 9
PAINLEVEL_OUTOF10: 9

## 2019-12-15 ASSESSMENT — PAIN DESCRIPTION - LOCATION: LOCATION: ABDOMEN

## 2019-12-16 LAB
-: NORMAL
AMORPHOUS: NORMAL
BACTERIA: NORMAL
BILIRUBIN URINE: ABNORMAL
C TRACH DNA GENITAL QL NAA+PROBE: NEGATIVE
CASTS UA: NORMAL /LPF (ref 0–8)
COLOR: ABNORMAL
COMMENT UA: ABNORMAL
CRYSTALS, UA: NORMAL /HPF
EKG ATRIAL RATE: 89 BPM
EKG P AXIS: 56 DEGREES
EKG P-R INTERVAL: 138 MS
EKG Q-T INTERVAL: 370 MS
EKG QRS DURATION: 86 MS
EKG QTC CALCULATION (BAZETT): 450 MS
EKG R AXIS: 65 DEGREES
EKG T AXIS: 56 DEGREES
EKG VENTRICULAR RATE: 89 BPM
EPITHELIAL CELLS UA: NORMAL /HPF (ref 0–5)
GLUCOSE URINE: NEGATIVE
KETONES, URINE: NEGATIVE
LEUKOCYTE ESTERASE, URINE: ABNORMAL
MUCUS: NORMAL
N. GONORRHOEAE DNA: NEGATIVE
NITRITE, URINE: POSITIVE
OTHER OBSERVATIONS UA: NORMAL
PH UA: 5 (ref 5–8)
PROTEIN UA: ABNORMAL
RBC UA: NORMAL /HPF (ref 0–4)
RENAL EPITHELIAL, UA: NORMAL /HPF
SPECIFIC GRAVITY UA: 1.01 (ref 1–1.03)
SPECIMEN DESCRIPTION: NORMAL
TRICHOMONAS: NORMAL
TURBIDITY: ABNORMAL
URINE HGB: ABNORMAL
UROBILINOGEN, URINE: NORMAL
WBC UA: NORMAL /HPF (ref 0–5)
YEAST: NORMAL

## 2019-12-16 PROCEDURE — 93010 ELECTROCARDIOGRAM REPORT: CPT | Performed by: INTERNAL MEDICINE

## 2019-12-16 PROCEDURE — 6370000000 HC RX 637 (ALT 250 FOR IP): Performed by: EMERGENCY MEDICINE

## 2019-12-16 RX ORDER — HYDROXYZINE HYDROCHLORIDE 25 MG/1
25 TABLET, FILM COATED ORAL EVERY 6 HOURS PRN
Qty: 20 TABLET | Refills: 0 | Status: SHIPPED | OUTPATIENT
Start: 2019-12-16 | End: 2019-12-26

## 2019-12-16 RX ORDER — CEPHALEXIN 500 MG/1
500 CAPSULE ORAL 4 TIMES DAILY
Qty: 28 CAPSULE | Refills: 0 | Status: SHIPPED | OUTPATIENT
Start: 2019-12-16 | End: 2019-12-23

## 2019-12-16 RX ORDER — CEPHALEXIN 500 MG/1
500 CAPSULE ORAL ONCE
Status: COMPLETED | OUTPATIENT
Start: 2019-12-16 | End: 2019-12-16

## 2019-12-16 RX ORDER — IBUPROFEN 800 MG/1
800 TABLET ORAL EVERY 8 HOURS PRN
Qty: 30 TABLET | Refills: 0 | Status: SHIPPED | OUTPATIENT
Start: 2019-12-16

## 2019-12-16 RX ADMIN — CEPHALEXIN 500 MG: 500 CAPSULE ORAL at 00:57

## 2019-12-17 LAB
CULTURE: NORMAL
Lab: NORMAL
SPECIMEN DESCRIPTION: NORMAL

## 2019-12-19 ASSESSMENT — ENCOUNTER SYMPTOMS
SORE THROAT: 0
ABDOMINAL PAIN: 1
EYE PAIN: 0
DIARRHEA: 0
COUGH: 0
SHORTNESS OF BREATH: 0
NAUSEA: 0
VOMITING: 0

## 2020-09-03 ENCOUNTER — HOSPITAL ENCOUNTER (EMERGENCY)
Age: 40
Discharge: HOME | End: 2020-09-03
Attending: EMERGENCY MEDICINE
Payer: MEDICARE

## 2020-09-03 VITALS
TEMPERATURE: 98.1 F | HEART RATE: 99 BPM | HEIGHT: 64 IN | RESPIRATION RATE: 16 BRPM | BODY MASS INDEX: 35 KG/M2 | WEIGHT: 205 LBS | OXYGEN SATURATION: 98 % | SYSTOLIC BLOOD PRESSURE: 135 MMHG | DIASTOLIC BLOOD PRESSURE: 88 MMHG

## 2020-09-03 LAB
-: ABNORMAL
AMORPHOUS: ABNORMAL
BACTERIA: ABNORMAL
BILIRUBIN URINE: ABNORMAL
CASTS UA: ABNORMAL /LPF (ref 0–2)
COLOR: ABNORMAL
CRYSTALS, UA: ABNORMAL /HPF
DIRECT EXAM: ABNORMAL
EPITHELIAL CELLS UA: ABNORMAL /HPF (ref 0–5)
GLUCOSE URINE: ABNORMAL
KETONES, URINE: ABNORMAL
LEUKOCYTE ESTERASE, URINE: ABNORMAL
Lab: ABNORMAL
MUCUS: ABNORMAL
NITRITE, URINE: POSITIVE
OTHER OBSERVATIONS UA: ABNORMAL
PH UA: 6.5 (ref 5–8)
PROTEIN UA: ABNORMAL
RBC UA: ABNORMAL /HPF (ref 0–2)
RENAL EPITHELIAL, UA: ABNORMAL /HPF
SPECIFIC GRAVITY UA: <1.005 (ref 1–1.03)
SPECIMEN DESCRIPTION: ABNORMAL
TRICHOMONAS: ABNORMAL
TURBIDITY: ABNORMAL
URINE HGB: ABNORMAL
UROBILINOGEN, URINE: ABNORMAL
WBC UA: ABNORMAL /HPF (ref 0–5)
YEAST: ABNORMAL

## 2020-09-03 PROCEDURE — 81001 URINALYSIS AUTO W/SCOPE: CPT

## 2020-09-03 PROCEDURE — 6370000000 HC RX 637 (ALT 250 FOR IP): Performed by: STUDENT IN AN ORGANIZED HEALTH CARE EDUCATION/TRAINING PROGRAM

## 2020-09-03 PROCEDURE — 87660 TRICHOMONAS VAGIN DIR PROBE: CPT

## 2020-09-03 PROCEDURE — 87480 CANDIDA DNA DIR PROBE: CPT

## 2020-09-03 PROCEDURE — 87591 N.GONORRHOEAE DNA AMP PROB: CPT

## 2020-09-03 PROCEDURE — 87510 GARDNER VAG DNA DIR PROBE: CPT

## 2020-09-03 PROCEDURE — 87491 CHLMYD TRACH DNA AMP PROBE: CPT

## 2020-09-03 PROCEDURE — 6360000002 HC RX W HCPCS: Performed by: STUDENT IN AN ORGANIZED HEALTH CARE EDUCATION/TRAINING PROGRAM

## 2020-09-03 PROCEDURE — 96372 THER/PROPH/DIAG INJ SC/IM: CPT

## 2020-09-03 PROCEDURE — 99283 EMERGENCY DEPT VISIT LOW MDM: CPT

## 2020-09-03 RX ORDER — CEFTRIAXONE SODIUM 250 MG/1
250 INJECTION, POWDER, FOR SOLUTION INTRAMUSCULAR; INTRAVENOUS ONCE
Status: COMPLETED | OUTPATIENT
Start: 2020-09-03 | End: 2020-09-03

## 2020-09-03 RX ORDER — AZITHROMYCIN 250 MG/1
1000 TABLET, FILM COATED ORAL ONCE
Status: COMPLETED | OUTPATIENT
Start: 2020-09-03 | End: 2020-09-03

## 2020-09-03 RX ORDER — AZITHROMYCIN 250 MG/1
500 TABLET, FILM COATED ORAL ONCE
Status: DISCONTINUED | OUTPATIENT
Start: 2020-09-03 | End: 2020-09-03

## 2020-09-03 RX ADMIN — AZITHROMYCIN 1000 MG: 250 TABLET, FILM COATED ORAL at 16:55

## 2020-09-03 RX ADMIN — CEFTRIAXONE SODIUM 250 MG: 250 INJECTION, POWDER, FOR SOLUTION INTRAMUSCULAR; INTRAVENOUS at 16:55

## 2020-09-03 ASSESSMENT — ENCOUNTER SYMPTOMS
COLOR CHANGE: 0
SHORTNESS OF BREATH: 0
NAUSEA: 0
ABDOMINAL PAIN: 0
COUGH: 0
VOMITING: 0

## 2020-09-03 NOTE — ED PROVIDER NOTES
[facility]     Emergency Department     Faculty Attestation    I performed a history and physical examination of the patient and discussed management with the resident. I have reviewed and agree with the residents findings including all diagnostic interpretations, and treatment plans as written. Any areas of disagreement are noted on the chart. I was personally present for the key portions of any procedures. I have documented in the chart those procedures where I was not present during the key portions. I have reviewed the emergency nurses triage note. I agree with the chief complaint, past medical history, past surgical history, allergies, medications, social and family history as documented unless otherwise noted below. Documentation of the HPI, Physical Exam and Medical Decision Making performed by scribmirta is based on my personal performance of the HPI, PE and MDM. For Physician Assistant/ Nurse Practitioner cases/documentation I have personally evaluated this patient and have completed at least one if not all key elements of the E/M (history, physical exam, and MDM). Additional findings are as noted.         Grace Carmichael D.O, M.P.H  Attending Emergency Medicine Physician        Grace Carmichael DO  09/03/20 0869

## 2020-09-03 NOTE — ED NOTES
Bed: 37  Expected date:   Expected time:   Means of arrival:   Comments:     Becki Mendosa RN  09/03/20 0614

## 2020-09-03 NOTE — ED NOTES
Urine specimen obtained and sent to lab via tube system     Pato Lim [facility]  09/03/20 9622

## 2020-09-03 NOTE — ED NOTES
Patient presents to ED for evaluation of STD exposure. Patient reports her boyfriend was recently diagnosed with gonorrhea and chlamydia. Patient reports having vaginal discharge for the past one week. Patient's LKMP is current beginning three days ago. Patient denies pain, fever, chills, nausea, vomiting.      Celia Waddell RN  09/03/20 2652

## 2020-09-03 NOTE — ED PROVIDER NOTES
[facility]  ED  Emergency Department Encounter  Emergency Medicine Resident     Pt Name: Tito Nugent  MRN: [id number]  [place] 1980  Date of evaluation: 9/3/20  PCP:  Kenji Carrasco MD    [address] [place] [address]       Chief Complaint   Patient presents with    Exposure to STD       HISTORY [facility]  (Location/Symptom, Timing/Onset, Context/Setting, Quality, Duration, Modifying Factors,Severity.)      Tito Nugent is a 39 y.o. female who presents with vaginal discharge and STD exposure. Patient states her  was treated after test positive for gonorrhea and chlamydia last week. Patient states she wants treatment for same. Patient declining pelvic exam.  Patient declining pelvic pain, urinary frequency or urgency, or dysuria. Patient states he is only here to get treated for the STDs he did. Patient denies any other complaints. PAST MEDICAL / SURGICAL / SOCIAL / FAMILY HISTORY      has a past medical history of Crack cocaine use, Hypertension, and Shoulder pain. has a past surgical history that includes Hand surgery (2011) and Abscess Drainage (Right, 07/02/2015).      Social History     Socioeconomic History    Marital status: Single     Spouse name: Not on file    Number of children: Not on file    Years of education: Not on file    Highest education level: Not on file   Occupational History    Not on file   Social Needs    Financial resource strain: Not on file    Food insecurity     Worry: Not on file     Inability: Not on file    Transportation needs     Medical: Not on file     Non-medical: Not on file   Tobacco Use    Smoking status: Current Every Day Smoker     Packs/day: 1.00     Years: 13.00     Pack years: 13.00     Types: Cigarettes    Smokeless tobacco: Never Used   Substance and Sexual Activity    Alcohol use: Yes     Comment: rare    Drug use: No    Sexual activity: Not on file   Lifestyle    Physical activity     Days per week: Not on file     Minutes per session: Not on file    Stress: Not on file   Relationships    Social connections     Talks on phone: Not on file     Gets together: Not on file     Attends Sikhism service: Not on file     Active member of club or organization: Not on file     Attends meetings of clubs or organizations: Not on file     Relationship status: Not on file    Intimate partner violence     Fear of current or ex partner: Not on file     Emotionally abused: Not on file     Physically abused: Not on file     Forced sexual activity: Not on file   Other Topics Concern    Not on file   Social History Narrative    Not on file       Family History   Problem Relation Age of Onset    High Blood Pressure Mother     High Blood Pressure Maternal Grandmother         Allergies:  Patient has no known allergies. Home Medications:  Prior to Admission medications    Medication Sig Start Date End Date Taking? Authorizing Provider   ibuprofen (ADVIL;MOTRIN) 800 MG tablet Take 1 tablet by mouth every 8 hours as needed for Pain 12/16/19   Alverto Preston MD   ferrous sulfate 325 (65 Fe) MG tablet Take 1 tablet by mouth daily (with breakfast) 8/10/19   Prachi Gallegos DO   lisinopril (PRINIVIL;ZESTRIL) 5 MG tablet Take 1 tablet by mouth daily 7/9/18   ORACIO Aldridge - CNP   Menthol (CEPACOL SORE THROAT) 5.4 MG LOZG Use as needed 1/8/18   Mahogany Colindres MD   omeprazole (PRILOSEC) 10 MG delayed release capsule Take 1 capsule by mouth daily 2/3/17   Divya Tobin MD   beclomethasone (QVAR) 80 MCG/ACT inhaler Inhale 2 puffs into the lungs 2 times daily    Historical Provider, MD       REVIEW OFSYSTEMS    (2-9 systems for level 4, 10 or more for level 5)      Review of Systems   Constitutional: Negative for chills, fatigue and fever. Respiratory: Negative for cough and shortness of breath. Cardiovascular: Negative for chest pain and palpitations.    Gastrointestinal: Negative for abdominal pain, nausea and precautions given. PROCEDURES:  None    CONSULTS:  None    CRITICAL CARE:  Please see attending note    FINAL IMPRESSION      1.  STD exposure          DISPOSITION / PLAN     DISPOSITION        PATIENT REFERRED TO:  Wilton Fernando MD  [address]  [phone]    Go in 1 week      [facility]  [address] [facility] [address] [phone]  Go to   If symptoms worsen      DISCHARGE MEDICATIONS:  Current Discharge Medication List          Osmany Rogel DO  Emergency Medicine Resident    (Please note that portions of this note were completed with a voice recognition program.Efforts were made to edit the dictations but occasionally words are mis-transcribed.)     Osmany Rogel DO  Resident  09/03/20 0808

## 2020-09-04 LAB
C TRACH DNA GENITAL QL NAA+PROBE: NEGATIVE
N. GONORRHOEAE DNA: NEGATIVE
SPECIMEN DESCRIPTION: NORMAL

## 2021-01-10 ENCOUNTER — HOSPITAL ENCOUNTER (EMERGENCY)
Age: 41
Discharge: HOME | End: 2021-01-10
Attending: EMERGENCY MEDICINE
Payer: MEDICARE

## 2021-01-10 VITALS
RESPIRATION RATE: 21 BRPM | HEIGHT: 64 IN | HEART RATE: 115 BPM | SYSTOLIC BLOOD PRESSURE: 133 MMHG | DIASTOLIC BLOOD PRESSURE: 87 MMHG | WEIGHT: 205 LBS | BODY MASS INDEX: 35 KG/M2 | TEMPERATURE: 98.4 F | OXYGEN SATURATION: 100 %

## 2021-01-10 DIAGNOSIS — J02.9 SORE THROAT: Primary | ICD-10-CM

## 2021-01-10 PROCEDURE — 90471 IMMUNIZATION ADMIN: CPT | Performed by: STUDENT IN AN ORGANIZED HEALTH CARE EDUCATION/TRAINING PROGRAM

## 2021-01-10 PROCEDURE — 6360000002 HC RX W HCPCS: Performed by: STUDENT IN AN ORGANIZED HEALTH CARE EDUCATION/TRAINING PROGRAM

## 2021-01-10 PROCEDURE — 90715 TDAP VACCINE 7 YRS/> IM: CPT | Performed by: STUDENT IN AN ORGANIZED HEALTH CARE EDUCATION/TRAINING PROGRAM

## 2021-01-10 PROCEDURE — 94640 AIRWAY INHALATION TREATMENT: CPT

## 2021-01-10 PROCEDURE — 99284 EMERGENCY DEPT VISIT MOD MDM: CPT

## 2021-01-10 RX ORDER — IBUPROFEN 400 MG/1
400 TABLET ORAL EVERY 6 HOURS PRN
Qty: 20 TABLET | Refills: 0 | Status: SHIPPED | OUTPATIENT
Start: 2021-01-10

## 2021-01-10 RX ORDER — AMOXICILLIN AND CLAVULANATE POTASSIUM 875; 125 MG/1; MG/1
1 TABLET, FILM COATED ORAL 2 TIMES DAILY
Qty: 20 TABLET | Refills: 0 | Status: SHIPPED | OUTPATIENT
Start: 2021-01-10 | End: 2021-01-20

## 2021-01-10 RX ORDER — LIDOCAINE HYDROCHLORIDE 40 MG/ML
SOLUTION TOPICAL ONCE
Status: DISCONTINUED | OUTPATIENT
Start: 2021-01-10 | End: 2021-01-10 | Stop reason: HOSPADM

## 2021-01-10 RX ADMIN — TETANUS TOXOID, REDUCED DIPHTHERIA TOXOID AND ACELLULAR PERTUSSIS VACCINE, ADSORBED 0.5 ML: 5; 2.5; 8; 8; 2.5 SUSPENSION INTRAMUSCULAR at 14:55

## 2021-01-10 NOTE — ED PROVIDER NOTES
[facility] ED  Emergency Department Encounter  Emergency Medicine Resident     Pt Name: Sammi Easley  MRN: [id number]  [place] 1980  Date of evaluation: 1/10/21  PCP:  Sulaiman Cleaning MD    [address] [place] [address]       Chief Complaint   Patient presents with    Oral Swelling    Pharyngitis       HISTORY OFPRESENT ILLNESS  (Location/Symptom, Timing/Onset, Context/Setting, Quality, Duration, Modifying Factors,Severity.)      Sammi Easley is a 38 y. o.yo female who presents with complaints of a 1 week history of sore throat. Patient states that she has been using Chloraseptic which has given her minimal relief. Denies any fever, chills, shortness of breath, chest pain, neck stiffness/pain. Of note patient was also bitten by dog yesterday, she was sent here to get a tetanus shot. Patient states since yesterday she has been having a change in her voice, not sure if is related to her screaming the lab work being bit by dog. Patient denies any increase in drooling, or increased back stiffness. PAST MEDICAL / SURGICAL / SOCIAL / FAMILY HISTORY      has a past medical history of Crack cocaine use, Hypertension, and Shoulder pain. has a past surgical history that includes Hand surgery (2011) and Abscess Drainage (Right, 07/02/2015).      Social History     Socioeconomic History    Marital status: Single     Spouse name: Not on file    Number of children: Not on file    Years of education: Not on file    Highest education level: Not on file   Occupational History    Not on file   Social Needs    Financial resource strain: Not on file    Food insecurity     Worry: Not on file     Inability: Not on file    Transportation needs     Medical: Not on file     Non-medical: Not on file   Tobacco Use    Smoking status: Current Every Day Smoker     Packs/day: 1.00     Years: 13.00     Pack years: 13.00     Types: Cigarettes    Smokeless tobacco: Never Used   Substance and Sexual Activity    Alcohol use: Yes     Comment: rare    Drug use: No    Sexual activity: Not on file   Lifestyle    Physical activity     Days per week: Not on file     Minutes per session: Not on file    Stress: Not on file   Relationships    Social connections     Talks on phone: Not on file     Gets together: Not on file     Attends Yazidi service: Not on file     Active member of club or organization: Not on file     Attends meetings of clubs or organizations: Not on file     Relationship status: Not on file    Intimate partner violence     Fear of current or ex partner: Not on file     Emotionally abused: Not on file     Physically abused: Not on file     Forced sexual activity: Not on file   Other Topics Concern    Not on file   Social History Narrative    Not on file       Family History   Problem Relation Age of Onset    High Blood Pressure Mother     High Blood Pressure Maternal Grandmother        Allergies:  Patient has no known allergies. Home Medications:  Prior to Admission medications    Medication Sig Start Date End Date Taking?  Authorizing Provider   amoxicillin-clavulanate (AUGMENTIN) 875-125 MG per tablet Take 1 tablet by mouth 2 times daily for 10 days 1/10/21 1/20/21 Yes Chantal Perez MD   ibuprofen (IBU) 400 MG tablet Take 1 tablet by mouth every 6 hours as needed for Pain 1/10/21  Yes Chantal Perez MD   menthol-cetylpyridinium (CEPACOL REGULAR STRENGTH) 3 MG lozenge Take 1 lozenge by mouth every 2 hours as needed for Sore Throat 1/10/21 1/17/21 Yes Chantal Perez MD   ibuprofen (ADVIL;MOTRIN) 800 MG tablet Take 1 tablet by mouth every 8 hours as needed for Pain 12/16/19   Cori Alanis MD   ferrous sulfate 325 (65 Fe) MG tablet Take 1 tablet by mouth daily (with breakfast) 8/10/19   Eric Oquendo DO   lisinopril (PRINIVIL;ZESTRIL) 5 MG tablet Take 1 tablet by mouth daily 7/9/18   ORACIO Lind CNP   Menthol (CEPACOL SORE THROAT) 5.4 MG LOZG Use as needed 1/8/18   Frida Mc MD   omeprazole (PRILOSEC) 10 MG delayed release capsule Take 1 capsule by mouth daily 2/3/17   Lucie Fernandez MD   beclomethasone (QVAR) 80 MCG/ACT inhaler Inhale 2 puffs into the lungs 2 times daily    Historical Provider, MD       REVIEW OFSYSTEMS    (2-9 systems for level 4, 10 or more for level 5)      Review of Systems   Constitutional: Negative for fatigue, fever and unexpected weight change. HENT: Positive for sore throat. Negative for rhinorrhea and sinus pressure. Eyes: Negative for photophobia and visual disturbance. Respiratory: Positive for cough and shortness of breath. Cardiovascular: Negative for chest pain and leg swelling. Gastrointestinal: Negative for abdominal pain, nausea and vomiting. Genitourinary: Negative for flank pain and frequency. Musculoskeletal: Negative for neck pain and neck stiffness. Skin: Positive for wound. Neurological: Negative for light-headedness and headaches. Psychiatric/Behavioral: Negative for behavioral problems and confusion. PHYSICAL EXAM   (up to 7 for level 4, 8 or more forlevel 5)      INITIAL VITALS:   ED Triage Vitals   BP Temp Temp Source Pulse Resp SpO2 Height Weight   01/10/21 1429 01/10/21 1428 01/10/21 1428 01/10/21 1428 01/10/21 1428 01/10/21 1428 01/10/21 1428 01/10/21 1428   133/87 98.4 °F (36.9 °C) Oral 115 21 100 % 5' 4\" (1.626 m) 205 lb (93 kg)       Physical Exam  Constitutional:       Appearance: She is well-developed and normal weight. She is not ill-appearing. HENT:      Head: Normocephalic and atraumatic. Right Ear: Tympanic membrane normal. No drainage, swelling or tenderness. Tympanic membrane is not erythematous. Left Ear: Tympanic membrane normal. No drainage, swelling or tenderness. Tympanic membrane is not erythematous. Nose: No congestion or rhinorrhea. Mouth/Throat:      Mouth: Mucous membranes are moist. No oral lesions.       Pharynx: No pharyngeal swelling, oropharyngeal exudate, posterior oropharyngeal erythema or uvula swelling. Tonsils: No tonsillar exudate or tonsillar abscesses. Eyes:      Extraocular Movements:      Right eye: Normal extraocular motion. Left eye: Normal extraocular motion. Conjunctiva/sclera: Conjunctivae normal.      Pupils: Pupils are equal, round, and reactive to light. Neck:      Musculoskeletal: Full passive range of motion without pain, normal range of motion and neck supple. No edema, erythema, neck rigidity or muscular tenderness. Trachea: Trachea normal.   Cardiovascular:      Rate and Rhythm: Tachycardia present. Heart sounds: No murmur. No friction rub. Pulmonary:      Effort: No respiratory distress. Breath sounds: No stridor. No wheezing or rhonchi. Abdominal:      General: There is no distension. Palpations: There is no mass. Tenderness: There is no abdominal tenderness. Musculoskeletal:         General: Tenderness (R lower leg tenderness at site of og big) and signs of injury (Presence of dog bit) present. No swelling. Right lower leg: No edema. Left lower leg: No edema. Skin:     Capillary Refill: Capillary refill takes less than 2 seconds. Neurological:      General: No focal deficit present. Mental Status: She is alert and oriented to person, place, and time. Cranial Nerves: No cranial nerve deficit. Psychiatric:         Mood and Affect: Mood normal.         Behavior: Behavior normal.      Comments: Pt a little worked up         [place] (LABS / IMAGING / EKG):  No orders of the defined types were placed in this encounter.       MEDICATIONS ORDERED:  Orders Placed This Encounter   Medications    Tetanus-Diphth-Acell Pertussis (BOOSTRIX) injection 0.5 mL    DISCONTD: lidocaine (XYLOCAINE) 4 % external solution    amoxicillin-clavulanate (AUGMENTIN) 875-125 MG per tablet     Sig: Take 1 tablet by mouth 2 times daily for 10 days     Dispense:  20 tablet     Refill:  0    ibuprofen (IBU) 400 MG tablet     Sig: Take 1 tablet by mouth every 6 hours as needed for Pain     Dispense:  20 tablet     Refill:  0    menthol-cetylpyridinium (CEPACOL REGULAR STRENGTH) 3 MG lozenge     Sig: Take 1 lozenge by mouth every 2 hours as needed for Sore Throat     Dispense:  30 lozenge     Refill:  0       DDX: strep pharyngitis, retropharyngeal abscess, tonsillar abscess,    Initial MDM/Plan: 38 y.o. female who presents with complaints of 1 week history of sore throat, patient was bit by dog yesterday. Not concerned for strep pharyngitis due to negative tonsillar erythema/exudate, negative fever and cough. plan to give her tetanus shot, prescription of Augmentin, perception for lozenges. DIAGNOSTIC RESULTS / EMERGENCYDEPARTMENT COURSE / MDM     LABS:  Labs Reviewed - No data to display      RADIOLOGY:  No results found. Not indicated    EKG  Not indicated    All EKG's are interpreted by the Emergency Department Physicianwho either signs or Co-signs this chart in the absence of a cardiologist.    EMERGENCY DEPARTMENT COURSE:  ED Course as of Jan 11 1808   Sun Jd 10, 2021   1520 Pt given nebulizer treatment with lidocaine to help with coughing and gagging symptom    [AN]      ED Course User Index  [AN] Mookie Chaudhary MD          PROCEDURES:  None    CONSULTS:  None    CRITICAL CARE:  Please see attending note    FINAL IMPRESSION      1.  Sore throat          DISPOSITION / PLAN     DISPOSITION Decision To Discharge 01/10/2021 03:45:48 PM      PATIENT REFERRED TO:  [facility]  [address] [facility]  [phone]    If symptoms worsen    Mark Espinosa MD  [address]  [phone]      As needed      DISCHARGE MEDICATIONS:  Discharge Medication List as of 1/10/2021  3:47 PM      START taking these medications    Details   amoxicillin-clavulanate (AUGMENTIN) 875-125 MG per tablet Take 1 tablet by mouth 2 times daily for 10 days, Disp-20 tablet, R-0Print      !! ibuprofen (IBU) 400 MG tablet Take 1 tablet by mouth every 6 hours as needed for Pain, Disp-20 tablet, R-0Print      !! menthol-cetylpyridinium (CEPACOL REGULAR STRENGTH) 3 MG lozenge Take 1 lozenge by mouth every 2 hours as needed for Sore Throat, Disp-30 lozenge, R-0Print       !! - Potential duplicate medications found. Please discuss with provider.           Claudia Russo MD  Emergency Medicine Resident    (Please note that portions of this note were completed with a voice recognition program.Efforts were made to edit the dictations but occasionally words are mis-transcribed.)        Claudia Russo MD  Resident  01/11/21 2484

## 2021-01-10 NOTE — ED PROVIDER NOTES
[facility]     Emergency Department     Faculty Note/ Attestation      Pt Name: Kike Olmstead                                       MRN: [id number]  [place] 1980  Date of evaluation: 1/10/2021    Patients PCP:    Kathryn Shaw MD      Attestation  I performed a history and physical examination of the patient and discussed management with the resident. I reviewed the residents note and agree with the documented findings and plan of care. Any areas of disagreement are noted on the chart. I was personally present for the key portions of any procedures. I have documented in the chart those procedures where I was not present during the key portions. I have reviewed the emergency nurses triage note. I agree with the chief complaint, past medical history, past surgical history, allergies, medications, social and family history as documented unless otherwise noted below. For Physician Assistant/ Nurse Practitioner cases/documentation I have personally evaluated this patient and have completed at least one if not all key elements of the E/M (history, physical exam, and MDM). Additional findings are as noted.       Initial Screens:        Ion Coma Scale  Eye Opening: Spontaneous  Best Verbal Response: Oriented  Best Motor Response: Obeys commands  Ion Coma Scale Score: 15    Vitals:    Vitals:    01/10/21 1428 01/10/21 1429   BP:  133/87   Pulse: 115    Resp: 21    Temp: 98.4 °F (36.9 °C)    TempSrc: Oral    SpO2: 100%    Weight: 205 lb (93 kg)    Height: 5' 4\" (1.626 m)        CHIEF COMPLAINT       Chief Complaint   Patient presents with    Oral Swelling    Pharyngitis             DIAGNOSTIC RESULTS             RADIOLOGY:   No orders to display         LABS:  Labs Reviewed - No data to display      EMERGENCY DEPARTMENT COURSE:     -------------------------  BP: 133/87, Temp: 98.4 °F (36.9 °C), Pulse: 115, Resp: 21      Comments    38year-old who was bitten by a dog yesterday, also with throat irritation and cough. She has irritation in her throat leading her to cough and gag, she has no swelling, erythema, exudate, trismus or stridor.     Update her tetanus, give Augmentin for the dog bite, attempt nebulized lidocaine for the throat irritation, and likely discharge with health department and PCP follow-up    (Please note that portions of this note were completed with a voice recognition program.  Efforts were made to edit the dictations but occasionally words are mis-transcribed.)      Olivas MD  Attending Emergency Physician         June Galvez MD  01/10/21 [id number]

## 2021-01-10 NOTE — ED PROVIDER NOTES
[facility]  ED  Emergency Department  Senior Resident Attestation     Primary Care Physician  Ashley Corey MD    I performed a history and physical examination of the patient and discussed management with the nitish resident. I reviewed the nitish residents note and agree with the documented findings and plan of care. Any areas of disagreement are noted on the chart. Case was then discussed with Faculty Attending Supervisor for additional medical management. PERTINENT ATTENDING PHYSICIAN COMMENTS:    HISTORY:   Stephen Johnson is a 38 y.o. female who  has a past medical history of Crack cocaine use, Hypertension, and Shoulder pain. and presents with complaint of dog bite and sore throat. Patient states that she was bitten by an unknown dog on her left leg and right hand yesterday. Patient states that she is here for a tetanus shot also she states that for the past few days she has been having a sore throat and has been losing her voice. She denies any drooling, shortness of breath, difficulty breathing, stridor or noisy breathing, neck pain or stiffness. Patient has a low Centor score as she is afebrile and does have a cough and no tonsillar exudates. We will treat patient with Augmentin for dog bite, we will update tetanus, no need for rabies vaccination as transmission from a dog [place] is incredibly rare. Risks and benefits of rabies vaccines have been discussed with the patient and she was instructed to follow-up with the health department if she wants to pursue this. No concern for peritonsillar abscess, retropharyngeal abscess, deep neck space infection, Chucky's angina, or any other life-threatening issues at this time.     PHYSICAL:   Temp: 98.4 °F (36.9 °C),  Pulse: 115, Resp: 21, BP: 133/87, SpO2: 100 %  Gen: Non-toxic, Afebrile  Neck: Supple, full range of motion, no tonsillar exudate or swelling of the posterior pharynx, no stridor  Cards: Regular rate and rhythm  Pulm:

## 2021-01-10 NOTE — ED TRIAGE NOTES
Pt arrived to the Ed with c/o bilateral ear pain, sore throat, and throat swelling. Pt states she was here the other day and was bit by a dog. Pt was told to come in and get a tetanus shot. Pt states she had a sore throat and now she feels her throat is swelling up and she is nervous because she is struggling to breathe. Pt is alert and oriented x4.

## 2021-01-11 ASSESSMENT — ENCOUNTER SYMPTOMS
NAUSEA: 0
ABDOMINAL PAIN: 0
VOMITING: 0
COUGH: 1
SORE THROAT: 1
SINUS PRESSURE: 0
PHOTOPHOBIA: 0
SHORTNESS OF BREATH: 1
RHINORRHEA: 0

## 2022-05-21 ENCOUNTER — HOSPITAL ENCOUNTER (EMERGENCY)
Age: 42
Discharge: HOME | End: 2022-05-21
Attending: EMERGENCY MEDICINE
Payer: MEDICARE

## 2022-05-21 VITALS
RESPIRATION RATE: 14 BRPM | TEMPERATURE: 98.9 F | SYSTOLIC BLOOD PRESSURE: 164 MMHG | DIASTOLIC BLOOD PRESSURE: 81 MMHG | OXYGEN SATURATION: 98 % | HEART RATE: 98 BPM

## 2022-05-21 DIAGNOSIS — J02.0 STREP PHARYNGITIS: Primary | ICD-10-CM

## 2022-05-21 LAB
FLU A ANTIGEN: NEGATIVE
FLU B ANTIGEN: NEGATIVE
S PYO AG THROAT QL: POSITIVE
SARS-COV-2, RAPID: NOT DETECTED
SOURCE: ABNORMAL
SPECIMEN DESCRIPTION: NORMAL

## 2022-05-21 PROCEDURE — 87880 STREP A ASSAY W/OPTIC: CPT

## 2022-05-21 PROCEDURE — 96372 THER/PROPH/DIAG INJ SC/IM: CPT

## 2022-05-21 PROCEDURE — 87635 SARS-COV-2 COVID-19 AMP PRB: CPT

## 2022-05-21 PROCEDURE — 6360000002 HC RX W HCPCS

## 2022-05-21 PROCEDURE — 6360000002 HC RX W HCPCS: Performed by: STUDENT IN AN ORGANIZED HEALTH CARE EDUCATION/TRAINING PROGRAM

## 2022-05-21 PROCEDURE — 87804 INFLUENZA ASSAY W/OPTIC: CPT

## 2022-05-21 PROCEDURE — 99284 EMERGENCY DEPT VISIT MOD MDM: CPT

## 2022-05-21 RX ORDER — DEXAMETHASONE SODIUM PHOSPHATE 4 MG/ML
4 INJECTION, SOLUTION INTRA-ARTICULAR; INTRALESIONAL; INTRAMUSCULAR; INTRAVENOUS; SOFT TISSUE ONCE
Status: COMPLETED | OUTPATIENT
Start: 2022-05-21 | End: 2022-05-21

## 2022-05-21 RX ORDER — KETOROLAC TROMETHAMINE 30 MG/ML
30 INJECTION, SOLUTION INTRAMUSCULAR; INTRAVENOUS ONCE
Status: COMPLETED | OUTPATIENT
Start: 2022-05-21 | End: 2022-05-21

## 2022-05-21 RX ADMIN — DEXAMETHASONE SODIUM PHOSPHATE 4 MG: 4 INJECTION, SOLUTION INTRA-ARTICULAR; INTRALESIONAL; INTRAMUSCULAR; INTRAVENOUS; SOFT TISSUE at 11:09

## 2022-05-21 RX ADMIN — PENICILLIN G BENZATHINE 1.2 MILLION UNITS: 1200000 INJECTION, SUSPENSION INTRAMUSCULAR at 11:09

## 2022-05-21 RX ADMIN — KETOROLAC TROMETHAMINE 30 MG: 30 INJECTION, SOLUTION INTRAMUSCULAR at 09:05

## 2022-05-21 NOTE — ED NOTES
Regular Covid 19 swab taken from right nare, labeled, placed in red dot bag, and handed off to second healthcare worker outside of room for transport to laboratory per hospital policy and procedure. Patient tolerated procedure well.        Codi Cee RN  05/21/22 8938

## 2022-05-21 NOTE — ED PROVIDER NOTES
[facility] ED  Emergency Department Encounter  Emergency Medicine Resident     Pt Name: Aden Velazquez  MRN: [id number]  [place] 1980  Date of evaluation: 5/21/22  PCP:  Aden Grossman MD    [address] [place] [address]       Chief Complaint   Patient presents with    Generalized Body Aches     x2 days       HISTORY OFPRESENT ILLNESS  (Location/Symptom, Timing/Onset, Context/Setting, Quality, Duration, Modifying Factors,Severity.)      Aden Velazquez is a 42 y.o. female who presents with diffuse body aches, sore throat, headache for the past 2 days. She denies fevers, chills, nausea, vomiting, abdominal pain, diarrhea constipation. No sick contacts. Was not vaccinated for COVID or flu. She denies any difficulty breathing, swallowing, changes in voice. PAST MEDICAL / SURGICAL / SOCIAL / FAMILY HISTORY      has a past medical history of Crack cocaine use, Hypertension, and Shoulder pain. has a past surgical history that includes Hand surgery (2011) and Abscess Drainage (Right, 07/02/2015). Social:  reports that she has been smoking cigarettes. She has a 13.00 pack-year smoking history. She has never used smokeless tobacco. She reports current alcohol use. She reports that she does not use drugs. Family Hx:   Family History   Problem Relation Age of Onset    High Blood Pressure Mother     High Blood Pressure Maternal Grandmother         Allergies:  Patient has no known allergies. Home Medications:  Prior to Admission medications    Medication Sig Start Date End Date Taking?  Authorizing Provider   ibuprofen (IBU) 400 MG tablet Take 1 tablet by mouth every 6 hours as needed for Pain 1/10/21   Lam Alex MD   ibuprofen (ADVIL;MOTRIN) 800 MG tablet Take 1 tablet by mouth every 8 hours as needed for Pain 12/16/19   Rj Gillespie MD   ferrous sulfate 325 (65 Fe) MG tablet Take 1 tablet by mouth daily (with breakfast) 8/10/19   Smita Monterroso DO   lisinopril (PRINIVIL;ZESTRIL) 5 MG tablet Take 1 tablet by mouth daily 7/9/18   ORACIO Zuniga - CNP   Menthol (CEPACOL SORE THROAT) 5.4 MG LOZG Use as needed 1/8/18   Nick Daniels MD   omeprazole (PRILOSEC) 10 MG delayed release capsule Take 1 capsule by mouth daily 2/3/17   Riley Lehman MD   beclomethasone (QVAR) 80 MCG/ACT inhaler Inhale 2 puffs into the lungs 2 times daily    Historical Provider, MD       REVIEW OFSYSTEMS    (2-9 systems for level 4, 10 or more for level 5)      Review of Systems   Constitutional: Negative for appetite change, chills, fatigue and fever. HENT: Positive for sore throat. Negative for congestion, rhinorrhea and sneezing. Eyes: Negative for visual disturbance. Respiratory: Negative for cough and shortness of breath. Cardiovascular: Negative for chest pain and leg swelling. Gastrointestinal: Negative for abdominal pain, diarrhea, nausea and vomiting. Genitourinary: Negative for dysuria. Musculoskeletal: Positive for myalgias. Negative for neck pain and neck stiffness. Skin: Negative for rash and wound. Neurological: Negative for dizziness, syncope, light-headedness and headaches. Psychiatric/Behavioral: Negative for dysphoric mood and suicidal ideas. PHYSICAL EXAM   (up to 7 for level 4, 8 or more forlevel 5)      INITIAL VITALS:   Vitals:    05/21/22 0838   BP: (!) 164/81   Pulse: 98   Resp: 14   Temp: 98.9 °F (37.2 °C)   SpO2: 98%        Physical Exam  Vitals and nursing note reviewed. Constitutional:       General: She is not in acute distress. Appearance: Normal appearance. She is not ill-appearing or diaphoretic. HENT:      Head: Normocephalic. Nose: Nose normal.      Mouth/Throat:      Mouth: Mucous membranes are moist.      Pharynx: Uvula midline. Posterior oropharyngeal erythema present. Eyes:      Extraocular Movements: Extraocular movements intact.       Conjunctiva/sclera: Conjunctivae normal.      Pupils: Pupils are equal, round, and reactive to light. Cardiovascular:      Rate and Rhythm: Normal rate and regular rhythm. Pulses: Normal pulses. Heart sounds: Normal heart sounds. Pulmonary:      Effort: Pulmonary effort is normal. No respiratory distress. Breath sounds: Normal breath sounds. No wheezing or rales. Chest:      Chest wall: No tenderness. Abdominal:      General: There is no distension. Palpations: Abdomen is soft. Tenderness: There is no abdominal tenderness. There is no guarding or rebound. Musculoskeletal:         General: Normal range of motion. Cervical back: Normal range of motion and neck supple. Skin:     General: Skin is warm. Capillary Refill: Capillary refill takes less than 2 seconds. Neurological:      General: No focal deficit present. Mental Status: She is alert and oriented to person, place, and time. Psychiatric:         Mood and Affect: Mood normal.         Behavior: Behavior normal.         DIFFERENTIAL  DIAGNOSIS     Initial MDM/Plan: 42 y.o. female who presents with diffuse body aches, sore throat for the past 2 days. No constitutional symptoms, respiratory distress, change in phonation, difficulty tolerating secretions. Vital signs notable for hypertension, otherwise afebrile and otherwise unremarkable. Physical examination does show some erythema to the posterior oropharynx, otherwise no lesions. No nasal congestion or rhinorrhea. No focal neurological deficits. Lungs clear to auscultation bilaterally. Abdomen soft, nondistended nontender. Heart is regular in rhythm. High suspicion for viral illness versus strep throat. Plan to obtain a rapid COVID, flu and rapid strep.     DIAGNOSTIC RESULTS / EMERGENCYDEPARTMENT COURSE / MDM     LABS:  Labs Reviewed   STREP SCREEN GROUP A THROAT - Abnormal; Notable for the following components:       Result Value    Strep A Ag POSITIVE (*)     All other components within normal limits   COVID-19, RAPID

## 2022-05-21 NOTE — ED PROVIDER NOTES
[facility] ED     Emergency Department     Faculty Attestation    I performed a history and physical examination of the patient and discussed management with the resident. I reviewed the residents note and agree with the documented findings and plan of care. Any areas of disagreement are noted on the chart. I was personally present for the key portions of any procedures. I have documented in the chart those procedures where I was not present during the key portions. I have reviewed the emergency nurses triage note. I agree with the chief complaint, past medical history, past surgical history, allergies, medications, social and family history as documented unless otherwise noted below. For Physician Assistant/ Nurse Practitioner cases/documentation I have personally evaluated this patient and have completed at least one if not all key elements of the E/M (history, physical exam, and MDM). Additional findings are as noted. Patient presents with generalized body aches, weakness and fatigue that she has had for the past 3 days or so. She says she has had a little bit of nausea as well. She denies fever, chest pain, shortness of breath, cough. She says she has had some congestion. Patient states that she has not gotten either a COVID or flu test this year. She has not tested herself for COVID. Patient has no significant medical history. On exam, patient is lying in the bed and appears ill but not toxic. She is alert and oriented and answers questions appropriately. Lungs are clear to auscultation bilaterally heart sounds are normal.  The posterior pharynx is erythematous without exudate. We will check a rapid strep as well as COVID and flu swabs. We will treat patient's symptoms.       Adenike Andres MD  Attending Emergency  Physician              Maciel Almeida MD  05/21/22 8579

## 2022-05-21 NOTE — Clinical Note
Viola Swartz was seen and treated in our emergency department on 5/21/2022. She may return to work on 05/23/2022. If you have any questions or concerns, please don't hesitate to call.       Clarence Serrano MD

## 2022-05-23 ASSESSMENT — ENCOUNTER SYMPTOMS
DIARRHEA: 0
SHORTNESS OF BREATH: 0
VOMITING: 0
NAUSEA: 0
COUGH: 0
SORE THROAT: 1
RHINORRHEA: 0
ABDOMINAL PAIN: 0

## 2023-02-03 ENCOUNTER — HOSPITAL ENCOUNTER (INPATIENT)
Age: 43
LOS: 3 days | Discharge: HOME | DRG: 720 | End: 2023-02-07
Attending: EMERGENCY MEDICINE | Admitting: INTERNAL MEDICINE
Payer: MEDICAID

## 2023-02-03 DIAGNOSIS — T44.905A ADVERSE EFFECT OF SYMPATHOMIMETICS, INITIAL ENCOUNTER: ICD-10-CM

## 2023-02-03 DIAGNOSIS — R50.81 FEVER IN OTHER DISEASES: ICD-10-CM

## 2023-02-03 DIAGNOSIS — D64.9 ANEMIA, UNSPECIFIED TYPE: ICD-10-CM

## 2023-02-03 DIAGNOSIS — E87.6 HYPOKALEMIA: ICD-10-CM

## 2023-02-03 DIAGNOSIS — J18.9 PNEUMONIA OF RIGHT LOWER LOBE DUE TO INFECTIOUS ORGANISM: ICD-10-CM

## 2023-02-03 DIAGNOSIS — R00.0 TACHYCARDIA: Primary | ICD-10-CM

## 2023-02-03 DIAGNOSIS — D50.0 IRON DEFICIENCY ANEMIA DUE TO CHRONIC BLOOD LOSS: Chronic | ICD-10-CM

## 2023-02-03 PROCEDURE — 93005 ELECTROCARDIOGRAM TRACING: CPT | Performed by: STUDENT IN AN ORGANIZED HEALTH CARE EDUCATION/TRAINING PROGRAM

## 2023-02-03 PROCEDURE — 99285 EMERGENCY DEPT VISIT HI MDM: CPT

## 2023-02-04 ENCOUNTER — APPOINTMENT (OUTPATIENT)
Dept: GENERAL RADIOLOGY | Age: 43
DRG: 720 | End: 2023-02-04
Payer: MEDICAID

## 2023-02-04 ENCOUNTER — APPOINTMENT (OUTPATIENT)
Dept: CT IMAGING | Age: 43
DRG: 720 | End: 2023-02-04
Payer: MEDICAID

## 2023-02-04 PROBLEM — T40.5X1A: Status: ACTIVE | Noted: 2023-02-04

## 2023-02-04 LAB
ABSOLUTE EOS #: 0 K/UL (ref 0–0.4)
ABSOLUTE IMMATURE GRANULOCYTE: 0 K/UL (ref 0–0.3)
ABSOLUTE LYMPH #: 0.7 K/UL (ref 1–4.8)
ABSOLUTE MONO #: 0.93 K/UL (ref 0.1–0.8)
ACETAMINOPHEN LEVEL: <5 UG/ML (ref 10–30)
ADENOVIRUS PCR: NOT DETECTED
ALBUMIN SERPL-MCNC: 4.2 G/DL (ref 3.5–5.2)
ALBUMIN/GLOBULIN RATIO: 1.1 (ref 1–2.5)
ALP SERPL-CCNC: 68 U/L (ref 35–104)
ALT SERPL-CCNC: 13 U/L (ref 5–33)
AMPHETAMINE SCREEN URINE: NEGATIVE
ANION GAP SERPL CALCULATED.3IONS-SCNC: 15 MMOL/L (ref 9–17)
AST SERPL-CCNC: 23 U/L
B PARAP IS1001 DNA NPH QL NAA+NON-PROBE: NOT DETECTED
B PERT DNA SPEC QL NAA+PROBE: NOT DETECTED
BARBITURATE SCREEN URINE: NEGATIVE
BASOPHILS # BLD: 0 % (ref 0–2)
BASOPHILS ABSOLUTE: 0 K/UL (ref 0–0.2)
BENZODIAZEPINE SCREEN, URINE: NEGATIVE
BILIRUB SERPL-MCNC: 0.9 MG/DL (ref 0.3–1.2)
BILIRUBIN URINE: NEGATIVE
BNP SERPL-MCNC: 386 PG/ML
BUN SERPL-MCNC: 8 MG/DL (ref 6–20)
CALCIUM SERPL-MCNC: 9.1 MG/DL (ref 8.6–10.4)
CANDIDA SPECIES, DNA PROBE: NEGATIVE
CANNABINOID SCREEN URINE: NEGATIVE
CASTS UA: ABNORMAL /LPF (ref 0–8)
CHLAMYDIA PNEUMONIAE BY PCR: NOT DETECTED
CHLORIDE SERPL-SCNC: 100 MMOL/L (ref 98–107)
CO2 SERPL-SCNC: 18 MMOL/L (ref 20–31)
COCAINE METABOLITE, URINE: POSITIVE
COLOR: YELLOW
CORONAVIRUS 229E PCR: NOT DETECTED
CORONAVIRUS HKU1 PCR: NOT DETECTED
CORONAVIRUS NL63 PCR: NOT DETECTED
CORONAVIRUS OC43 PCR: NOT DETECTED
CREAT SERPL-MCNC: 0.6 MG/DL (ref 0.5–0.9)
D DIMER BLD IA.RAPID-MCNC: 1 MG/L FEU
EOSINOPHILS RELATIVE PERCENT: 0 % (ref 1–4)
EPITHELIAL CELLS UA: ABNORMAL /HPF (ref 0–5)
ETHANOL PERCENT: <0.01 %
ETHANOL: <10 MG/DL
FENTANYL URINE: NEGATIVE
FERRITIN SERPL-MCNC: 19 NG/ML (ref 13–150)
FLUAV AG SPEC QL: NEGATIVE
FLUBV AG SPEC QL: NEGATIVE
GARDNERELLA VAGINALIS, DNA PROBE: POSITIVE
GFR SERPL CREATININE-BSD FRML MDRD: >60 ML/MIN/1.73M2
GLUCOSE SERPL-MCNC: 116 MG/DL (ref 70–99)
GLUCOSE UR STRIP.AUTO-MCNC: NEGATIVE MG/DL
HCG QUALITATIVE: NEGATIVE
HCT VFR BLD AUTO: 19.1 % (ref 36.3–47.1)
HCT VFR BLD AUTO: 24.6 % (ref 36.3–47.1)
HCT VFR BLD AUTO: 25.9 % (ref 36.3–47.1)
HCT VFR BLD AUTO: 28.8 % (ref 36.3–47.1)
HGB BLD-MCNC: 4.5 G/DL (ref 11.9–15.1)
HGB BLD-MCNC: 6.5 G/DL (ref 11.9–15.1)
HGB BLD-MCNC: 7.1 G/DL (ref 11.9–15.1)
HGB BLD-MCNC: 7.8 G/DL (ref 11.9–15.1)
HUMAN METAPNEUMOVIRUS PCR: NOT DETECTED
IMMATURE GRANULOCYTES: 0 %
INFLUENZA A BY PCR: NOT DETECTED
INFLUENZA B BY PCR: NOT DETECTED
INR PPP: 1.1
IRON SATURATION: 7 % (ref 20–55)
IRON SERPL-MCNC: 25 UG/DL (ref 37–145)
KETONES UR STRIP.AUTO-MCNC: NEGATIVE MG/DL
LACTIC ACID, WHOLE BLOOD: 1.1 MMOL/L (ref 0.7–2.1)
LEUKOCYTE ESTERASE UR QL STRIP.AUTO: NEGATIVE
LYMPHOCYTES # BLD: 3 % (ref 24–44)
MAGNESIUM SERPL-MCNC: 1.6 MG/DL (ref 1.6–2.6)
MCH RBC QN AUTO: 13.4 PG (ref 25.2–33.5)
MCHC RBC AUTO-ENTMCNC: 23.6 G/DL (ref 28.4–34.8)
MCV RBC AUTO: 56.8 FL (ref 82.6–102.9)
METHADONE SCREEN, URINE: NEGATIVE
MONOCYTES # BLD: 4 % (ref 1–7)
MORPHOLOGY: ABNORMAL
MRSA, DNA, NASAL: ABNORMAL
MYCOPLASMA PNEUMONIAE PCR: NOT DETECTED
NITRITE UR QL STRIP.AUTO: NEGATIVE
NRBC AUTOMATED: 0.2 PER 100 WBC
OPIATES, URINE: NEGATIVE
OXYCODONE SCREEN URINE: NEGATIVE
PARAINFLUENZA 1 PCR: NOT DETECTED
PARAINFLUENZA 2 PCR: NOT DETECTED
PARAINFLUENZA 3 PCR: NOT DETECTED
PARAINFLUENZA 4 PCR: NOT DETECTED
PARTIAL THROMBOPLASTIN TIME: 19.5 SEC (ref 20.5–30.5)
PDW BLD-RTO: 23 % (ref 11.8–14.4)
PHENCYCLIDINE, URINE: NEGATIVE
PLATELET # BLD AUTO: ABNORMAL K/UL (ref 138–453)
PLATELET, FLUORESCENCE: 412 K/UL (ref 138–453)
PLATELET, IMMATURE FRACTION: 6.3 % (ref 1.1–10.3)
POTASSIUM SERPL-SCNC: 3.2 MMOL/L (ref 3.7–5.3)
PROCALCITONIN SERPL-MCNC: 1.73 NG/ML
PROT SERPL-MCNC: 8 G/DL (ref 6.4–8.3)
PROT UR STRIP.AUTO-MCNC: 5.5 MG/DL (ref 5–8)
PROT UR STRIP.AUTO-MCNC: NEGATIVE MG/DL
PROTHROMBIN TIME: 11.5 SEC (ref 9.1–12.3)
RBC # BLD: 3.36 M/UL (ref 3.95–5.11)
RBC CLUMPS #/AREA URNS AUTO: ABNORMAL /HPF (ref 0–4)
RESP SYNCYTIAL VIRUS PCR: NOT DETECTED
RHINO/ENTEROVIRUS PCR: NOT DETECTED
SALICYLATE LEVEL: <1 MG/DL (ref 3–10)
SARS-COV-2 RDRP RESP QL NAA+PROBE: NOT DETECTED
SARS-COV-2 RNA NPH QL NAA+NON-PROBE: NOT DETECTED
SEG NEUTROPHILS: 93 % (ref 36–66)
SEGMENTED NEUTROPHILS ABSOLUTE COUNT: 21.57 K/UL (ref 1.8–7.7)
SODIUM SERPL-SCNC: 133 MMOL/L (ref 135–144)
SOURCE: ABNORMAL
SPECIFIC GRAVITY UA: 1.01 (ref 1–1.03)
SPECIMEN DESCRIPTION: ABNORMAL
SPECIMEN DESCRIPTION: NORMAL
SPECIMEN DESCRIPTION: NORMAL
TEST INFORMATION: ABNORMAL
TIBC SERPL-MCNC: 363 UG/DL (ref 250–450)
TOXIC TRICYCLIC SC,BLOOD: NEGATIVE
TRICHOMONAS VAGINALIS DNA: NEGATIVE
TROPONIN I SERPL DL<=0.01 NG/ML-MCNC: 21 NG/L (ref 0–14)
TROPONIN I SERPL DL<=0.01 NG/ML-MCNC: 27 NG/L (ref 0–14)
TROPONIN I SERPL DL<=0.01 NG/ML-MCNC: 27 NG/L (ref 0–14)
TROPONIN I SERPL DL<=0.01 NG/ML-MCNC: 34 NG/L (ref 0–14)
TSH SERPL-ACNC: 0.52 UIU/ML (ref 0.3–5)
TURBIDITY: CLEAR
UNSATURATED IRON BINDING CAPACITY: 338 UG/DL (ref 112–347)
URINE HGB: ABNORMAL
UROBILINOGEN, URINE: NORMAL
WBC # BLD AUTO: 23.2 K/UL (ref 3.5–11.3)
WBC UA: ABNORMAL /HPF (ref 0–5)

## 2023-02-04 PROCEDURE — 80307 DRUG TEST PRSMV CHEM ANLYZR: CPT

## 2023-02-04 PROCEDURE — 85018 HEMOGLOBIN: CPT

## 2023-02-04 PROCEDURE — 86901 BLOOD TYPING SEROLOGIC RH(D): CPT

## 2023-02-04 PROCEDURE — 86738 MYCOPLASMA ANTIBODY: CPT

## 2023-02-04 PROCEDURE — 6370000000 HC RX 637 (ALT 250 FOR IP): Performed by: STUDENT IN AN ORGANIZED HEALTH CARE EDUCATION/TRAINING PROGRAM

## 2023-02-04 PROCEDURE — 87660 TRICHOMONAS VAGIN DIR PROBE: CPT

## 2023-02-04 PROCEDURE — 83550 IRON BINDING TEST: CPT

## 2023-02-04 PROCEDURE — 83540 ASSAY OF IRON: CPT

## 2023-02-04 PROCEDURE — 99291 CRITICAL CARE FIRST HOUR: CPT | Performed by: INTERNAL MEDICINE

## 2023-02-04 PROCEDURE — 83880 ASSAY OF NATRIURETIC PEPTIDE: CPT

## 2023-02-04 PROCEDURE — 87086 URINE CULTURE/COLONY COUNT: CPT

## 2023-02-04 PROCEDURE — 2000000000 HC ICU R&B

## 2023-02-04 PROCEDURE — 85379 FIBRIN DEGRADATION QUANT: CPT

## 2023-02-04 PROCEDURE — 80179 DRUG ASSAY SALICYLATE: CPT

## 2023-02-04 PROCEDURE — 93005 ELECTROCARDIOGRAM TRACING: CPT | Performed by: STUDENT IN AN ORGANIZED HEALTH CARE EDUCATION/TRAINING PROGRAM

## 2023-02-04 PROCEDURE — 6360000002 HC RX W HCPCS: Performed by: STUDENT IN AN ORGANIZED HEALTH CARE EDUCATION/TRAINING PROGRAM

## 2023-02-04 PROCEDURE — 87070 CULTURE OTHR SPECIMN AEROBIC: CPT

## 2023-02-04 PROCEDURE — 83735 ASSAY OF MAGNESIUM: CPT

## 2023-02-04 PROCEDURE — 71260 CT THORAX DX C+: CPT | Performed by: STUDENT IN AN ORGANIZED HEALTH CARE EDUCATION/TRAINING PROGRAM

## 2023-02-04 PROCEDURE — 80143 DRUG ASSAY ACETAMINOPHEN: CPT

## 2023-02-04 PROCEDURE — 86920 COMPATIBILITY TEST SPIN: CPT

## 2023-02-04 PROCEDURE — 6360000002 HC RX W HCPCS: Performed by: INTERNAL MEDICINE

## 2023-02-04 PROCEDURE — 2580000003 HC RX 258: Performed by: STUDENT IN AN ORGANIZED HEALTH CARE EDUCATION/TRAINING PROGRAM

## 2023-02-04 PROCEDURE — 84443 ASSAY THYROID STIM HORMONE: CPT

## 2023-02-04 PROCEDURE — 83605 ASSAY OF LACTIC ACID: CPT

## 2023-02-04 PROCEDURE — 6370000000 HC RX 637 (ALT 250 FOR IP)

## 2023-02-04 PROCEDURE — 81001 URINALYSIS AUTO W/SCOPE: CPT

## 2023-02-04 PROCEDURE — 87491 CHLMYD TRACH DNA AMP PROBE: CPT

## 2023-02-04 PROCEDURE — 87641 MR-STAPH DNA AMP PROBE: CPT

## 2023-02-04 PROCEDURE — 96361 HYDRATE IV INFUSION ADD-ON: CPT

## 2023-02-04 PROCEDURE — 36415 COLL VENOUS BLD VENIPUNCTURE: CPT

## 2023-02-04 PROCEDURE — 96365 THER/PROPH/DIAG IV INF INIT: CPT

## 2023-02-04 PROCEDURE — 85055 RETICULATED PLATELET ASSAY: CPT

## 2023-02-04 PROCEDURE — 84484 ASSAY OF TROPONIN QUANT: CPT

## 2023-02-04 PROCEDURE — 87480 CANDIDA DNA DIR PROBE: CPT

## 2023-02-04 PROCEDURE — 94761 N-INVAS EAR/PLS OXIMETRY MLT: CPT

## 2023-02-04 PROCEDURE — 6360000004 HC RX CONTRAST MEDICATION: Performed by: STUDENT IN AN ORGANIZED HEALTH CARE EDUCATION/TRAINING PROGRAM

## 2023-02-04 PROCEDURE — 85730 THROMBOPLASTIN TIME PARTIAL: CPT

## 2023-02-04 PROCEDURE — 87635 SARS-COV-2 COVID-19 AMP PRB: CPT

## 2023-02-04 PROCEDURE — 0202U NFCT DS 22 TRGT SARS-COV-2: CPT

## 2023-02-04 PROCEDURE — 87804 INFLUENZA ASSAY W/OPTIC: CPT

## 2023-02-04 PROCEDURE — 84145 PROCALCITONIN (PCT): CPT

## 2023-02-04 PROCEDURE — 2500000003 HC RX 250 WO HCPCS

## 2023-02-04 PROCEDURE — 82728 ASSAY OF FERRITIN: CPT

## 2023-02-04 PROCEDURE — 2580000003 HC RX 258: Performed by: INTERNAL MEDICINE

## 2023-02-04 PROCEDURE — 96375 TX/PRO/DX INJ NEW DRUG ADDON: CPT

## 2023-02-04 PROCEDURE — 80053 COMPREHEN METABOLIC PANEL: CPT

## 2023-02-04 PROCEDURE — 87449 NOS EACH ORGANISM AG IA: CPT

## 2023-02-04 PROCEDURE — 87899 AGENT NOS ASSAY W/OPTIC: CPT

## 2023-02-04 PROCEDURE — 6360000002 HC RX W HCPCS

## 2023-02-04 PROCEDURE — 87591 N.GONORRHOEAE DNA AMP PROB: CPT

## 2023-02-04 PROCEDURE — 85610 PROTHROMBIN TIME: CPT

## 2023-02-04 PROCEDURE — 87510 GARDNER VAG DNA DIR PROBE: CPT

## 2023-02-04 PROCEDURE — 71045 X-RAY EXAM CHEST 1 VIEW: CPT

## 2023-02-04 PROCEDURE — 87205 SMEAR GRAM STAIN: CPT

## 2023-02-04 PROCEDURE — 85014 HEMATOCRIT: CPT

## 2023-02-04 PROCEDURE — 86900 BLOOD TYPING SEROLOGIC ABO: CPT

## 2023-02-04 PROCEDURE — 84703 CHORIONIC GONADOTROPIN ASSAY: CPT

## 2023-02-04 PROCEDURE — 51798 US URINE CAPACITY MEASURE: CPT

## 2023-02-04 PROCEDURE — 2580000003 HC RX 258

## 2023-02-04 PROCEDURE — P9016 RBC LEUKOCYTES REDUCED: HCPCS

## 2023-02-04 PROCEDURE — 36430 TRANSFUSION BLD/BLD COMPNT: CPT

## 2023-02-04 PROCEDURE — 94640 AIRWAY INHALATION TREATMENT: CPT

## 2023-02-04 PROCEDURE — 87040 BLOOD CULTURE FOR BACTERIA: CPT

## 2023-02-04 PROCEDURE — 86850 RBC ANTIBODY SCREEN: CPT

## 2023-02-04 PROCEDURE — G0480 DRUG TEST DEF 1-7 CLASSES: HCPCS

## 2023-02-04 PROCEDURE — 85025 COMPLETE CBC W/AUTO DIFF WBC: CPT

## 2023-02-04 RX ORDER — ENOXAPARIN SODIUM 100 MG/ML
40 INJECTION SUBCUTANEOUS DAILY
Status: DISCONTINUED | OUTPATIENT
Start: 2023-02-04 | End: 2023-02-07 | Stop reason: HOSPADM

## 2023-02-04 RX ORDER — IPRATROPIUM BROMIDE AND ALBUTEROL SULFATE 2.5; .5 MG/3ML; MG/3ML
1 SOLUTION RESPIRATORY (INHALATION)
Status: DISCONTINUED | OUTPATIENT
Start: 2023-02-04 | End: 2023-02-07 | Stop reason: HOSPADM

## 2023-02-04 RX ORDER — ACETAMINOPHEN 650 MG/1
650 SUPPOSITORY RECTAL EVERY 6 HOURS PRN
Status: DISCONTINUED | OUTPATIENT
Start: 2023-02-04 | End: 2023-02-07 | Stop reason: HOSPADM

## 2023-02-04 RX ORDER — ONDANSETRON 4 MG/1
4 TABLET, ORALLY DISINTEGRATING ORAL EVERY 8 HOURS PRN
Status: DISCONTINUED | OUTPATIENT
Start: 2023-02-04 | End: 2023-02-07 | Stop reason: HOSPADM

## 2023-02-04 RX ORDER — SODIUM CHLORIDE 9 MG/ML
INJECTION, SOLUTION INTRAVENOUS CONTINUOUS
Status: DISCONTINUED | OUTPATIENT
Start: 2023-02-04 | End: 2023-02-07 | Stop reason: HOSPADM

## 2023-02-04 RX ORDER — SODIUM CHLORIDE 9 MG/ML
INJECTION, SOLUTION INTRAVENOUS PRN
Status: DISCONTINUED | OUTPATIENT
Start: 2023-02-04 | End: 2023-02-06

## 2023-02-04 RX ORDER — ACETAMINOPHEN 500 MG
1000 TABLET ORAL ONCE
Status: COMPLETED | OUTPATIENT
Start: 2023-02-04 | End: 2023-02-04

## 2023-02-04 RX ORDER — LORAZEPAM 2 MG/ML
2 INJECTION INTRAMUSCULAR EVERY 4 HOURS PRN
Status: DISCONTINUED | OUTPATIENT
Start: 2023-02-04 | End: 2023-02-07 | Stop reason: HOSPADM

## 2023-02-04 RX ORDER — SODIUM CHLORIDE 0.9 % (FLUSH) 0.9 %
5-40 SYRINGE (ML) INJECTION PRN
Status: DISCONTINUED | OUTPATIENT
Start: 2023-02-04 | End: 2023-02-07 | Stop reason: HOSPADM

## 2023-02-04 RX ORDER — POTASSIUM CHLORIDE 7.45 MG/ML
10 INJECTION INTRAVENOUS
Status: DISCONTINUED | OUTPATIENT
Start: 2023-02-04 | End: 2023-02-04

## 2023-02-04 RX ORDER — GUAIFENESIN 600 MG/1
600 TABLET, EXTENDED RELEASE ORAL ONCE
Status: COMPLETED | OUTPATIENT
Start: 2023-02-04 | End: 2023-02-04

## 2023-02-04 RX ORDER — FENTANYL CITRATE 50 UG/ML
50 INJECTION, SOLUTION INTRAMUSCULAR; INTRAVENOUS EVERY 4 HOURS PRN
Status: DISCONTINUED | OUTPATIENT
Start: 2023-02-04 | End: 2023-02-04

## 2023-02-04 RX ORDER — LORAZEPAM 2 MG/ML
1 INJECTION INTRAMUSCULAR EVERY 4 HOURS PRN
Status: DISCONTINUED | OUTPATIENT
Start: 2023-02-04 | End: 2023-02-04

## 2023-02-04 RX ORDER — SODIUM CHLORIDE 0.9 % (FLUSH) 0.9 %
5-40 SYRINGE (ML) INJECTION EVERY 12 HOURS SCHEDULED
Status: DISCONTINUED | OUTPATIENT
Start: 2023-02-04 | End: 2023-02-07 | Stop reason: HOSPADM

## 2023-02-04 RX ORDER — LORAZEPAM 2 MG/ML
1 INJECTION INTRAMUSCULAR ONCE
Status: COMPLETED | OUTPATIENT
Start: 2023-02-04 | End: 2023-02-04

## 2023-02-04 RX ORDER — 0.9 % SODIUM CHLORIDE 0.9 %
1000 INTRAVENOUS SOLUTION INTRAVENOUS ONCE
Status: COMPLETED | OUTPATIENT
Start: 2023-02-04 | End: 2023-02-04

## 2023-02-04 RX ORDER — ACETAMINOPHEN 325 MG/1
650 TABLET ORAL EVERY 6 HOURS PRN
Status: DISCONTINUED | OUTPATIENT
Start: 2023-02-04 | End: 2023-02-07 | Stop reason: HOSPADM

## 2023-02-04 RX ORDER — SODIUM CHLORIDE 9 MG/ML
INJECTION, SOLUTION INTRAVENOUS PRN
Status: DISCONTINUED | OUTPATIENT
Start: 2023-02-04 | End: 2023-02-07 | Stop reason: HOSPADM

## 2023-02-04 RX ORDER — KETOROLAC TROMETHAMINE 30 MG/ML
30 INJECTION, SOLUTION INTRAMUSCULAR; INTRAVENOUS ONCE
Status: COMPLETED | OUTPATIENT
Start: 2023-02-04 | End: 2023-02-04

## 2023-02-04 RX ORDER — FENTANYL CITRATE 50 UG/ML
50 INJECTION, SOLUTION INTRAMUSCULAR; INTRAVENOUS EVERY 4 HOURS PRN
Status: DISCONTINUED | OUTPATIENT
Start: 2023-02-04 | End: 2023-02-07 | Stop reason: HOSPADM

## 2023-02-04 RX ORDER — POLYETHYLENE GLYCOL 3350 17 G/17G
17 POWDER, FOR SOLUTION ORAL DAILY PRN
Status: DISCONTINUED | OUTPATIENT
Start: 2023-02-04 | End: 2023-02-07 | Stop reason: HOSPADM

## 2023-02-04 RX ORDER — SODIUM CHLORIDE 9 MG/ML
INJECTION, SOLUTION INTRAVENOUS PRN
Status: COMPLETED | OUTPATIENT
Start: 2023-02-04 | End: 2023-02-04

## 2023-02-04 RX ORDER — ONDANSETRON 2 MG/ML
4 INJECTION INTRAMUSCULAR; INTRAVENOUS EVERY 6 HOURS PRN
Status: DISCONTINUED | OUTPATIENT
Start: 2023-02-04 | End: 2023-02-07 | Stop reason: HOSPADM

## 2023-02-04 RX ORDER — MIDAZOLAM HYDROCHLORIDE 2 MG/2ML
1 INJECTION, SOLUTION INTRAMUSCULAR; INTRAVENOUS ONCE
Status: COMPLETED | OUTPATIENT
Start: 2023-02-04 | End: 2023-02-04

## 2023-02-04 RX ORDER — POTASSIUM CHLORIDE 7.45 MG/ML
10 INJECTION INTRAVENOUS
Status: COMPLETED | OUTPATIENT
Start: 2023-02-04 | End: 2023-02-04

## 2023-02-04 RX ADMIN — POTASSIUM CHLORIDE 10 MEQ: 10 INJECTION, SOLUTION INTRAVENOUS at 10:01

## 2023-02-04 RX ADMIN — VANCOMYCIN HYDROCHLORIDE 1250 MG: 10 INJECTION, POWDER, LYOPHILIZED, FOR SOLUTION INTRAVENOUS at 14:08

## 2023-02-04 RX ADMIN — ACETAMINOPHEN 1000 MG: 500 TABLET ORAL at 01:44

## 2023-02-04 RX ADMIN — SODIUM CHLORIDE, PRESERVATIVE FREE 10 ML: 5 INJECTION INTRAVENOUS at 21:35

## 2023-02-04 RX ADMIN — LORAZEPAM 1 MG: 2 INJECTION INTRAMUSCULAR at 09:57

## 2023-02-04 RX ADMIN — LORAZEPAM 1 MG: 2 INJECTION INTRAMUSCULAR at 04:52

## 2023-02-04 RX ADMIN — LORAZEPAM 1 MG: 2 INJECTION INTRAMUSCULAR; INTRAVENOUS at 08:13

## 2023-02-04 RX ADMIN — SODIUM CHLORIDE: 9 INJECTION, SOLUTION INTRAVENOUS at 08:21

## 2023-02-04 RX ADMIN — SODIUM CHLORIDE: 9 INJECTION, SOLUTION INTRAVENOUS at 09:28

## 2023-02-04 RX ADMIN — IOPAMIDOL 75 ML: 755 INJECTION, SOLUTION INTRAVENOUS at 02:02

## 2023-02-04 RX ADMIN — THIAMINE HYDROCHLORIDE: 100 INJECTION, SOLUTION INTRAMUSCULAR; INTRAVENOUS at 14:15

## 2023-02-04 RX ADMIN — POTASSIUM CHLORIDE 10 MEQ: 10 INJECTION, SOLUTION INTRAVENOUS at 08:58

## 2023-02-04 RX ADMIN — SODIUM CHLORIDE: 9 INJECTION, SOLUTION INTRAVENOUS at 21:14

## 2023-02-04 RX ADMIN — PIPERACILLIN AND TAZOBACTAM 3375 MG: 3; .375 INJECTION, POWDER, FOR SOLUTION INTRAVENOUS at 16:02

## 2023-02-04 RX ADMIN — PIPERACILLIN AND TAZOBACTAM 3375 MG: 3; .375 INJECTION, POWDER, FOR SOLUTION INTRAVENOUS at 09:31

## 2023-02-04 RX ADMIN — SODIUM CHLORIDE: 9 INJECTION, SOLUTION INTRAVENOUS at 07:44

## 2023-02-04 RX ADMIN — GUAIFENESIN 600 MG: 600 TABLET, EXTENDED RELEASE ORAL at 01:35

## 2023-02-04 RX ADMIN — POTASSIUM CHLORIDE 10 MEQ: 10 INJECTION, SOLUTION INTRAVENOUS at 07:45

## 2023-02-04 RX ADMIN — SODIUM CHLORIDE 1000 ML: 9 INJECTION, SOLUTION INTRAVENOUS at 02:55

## 2023-02-04 RX ADMIN — FENTANYL CITRATE 50 MCG: 50 INJECTION, SOLUTION INTRAMUSCULAR; INTRAVENOUS at 15:59

## 2023-02-04 RX ADMIN — MIDAZOLAM 1 MG: 1 INJECTION INTRAMUSCULAR; INTRAVENOUS at 00:27

## 2023-02-04 RX ADMIN — IPRATROPIUM BROMIDE AND ALBUTEROL SULFATE 1 AMPULE: 2.5; .5 SOLUTION RESPIRATORY (INHALATION) at 16:11

## 2023-02-04 RX ADMIN — POTASSIUM CHLORIDE 10 MEQ: 10 INJECTION, SOLUTION INTRAVENOUS at 11:06

## 2023-02-04 RX ADMIN — Medication 1250 MG: at 02:57

## 2023-02-04 RX ADMIN — SODIUM CHLORIDE 1000 ML: 9 INJECTION, SOLUTION INTRAVENOUS at 00:23

## 2023-02-04 RX ADMIN — IPRATROPIUM BROMIDE AND ALBUTEROL SULFATE 1 AMPULE: 2.5; .5 SOLUTION RESPIRATORY (INHALATION) at 20:20

## 2023-02-04 RX ADMIN — SODIUM CHLORIDE, PRESERVATIVE FREE 10 ML: 5 INJECTION INTRAVENOUS at 07:40

## 2023-02-04 RX ADMIN — KETOROLAC TROMETHAMINE 30 MG: 30 INJECTION, SOLUTION INTRAMUSCULAR; INTRAVENOUS at 00:25

## 2023-02-04 RX ADMIN — PIPERACILLIN AND TAZOBACTAM 3375 MG: 3; .375 INJECTION, POWDER, LYOPHILIZED, FOR SOLUTION INTRAVENOUS at 02:18

## 2023-02-04 ASSESSMENT — LIFESTYLE VARIABLES
HOW MANY STANDARD DRINKS CONTAINING ALCOHOL DO YOU HAVE ON A TYPICAL DAY: PATIENT DECLINED
HOW OFTEN DO YOU HAVE A DRINK CONTAINING ALCOHOL: PATIENT DECLINED

## 2023-02-04 ASSESSMENT — ENCOUNTER SYMPTOMS
ABDOMINAL PAIN: 0
PHOTOPHOBIA: 0
VOMITING: 0
NAUSEA: 0
SHORTNESS OF BREATH: 1
COUGH: 1

## 2023-02-04 ASSESSMENT — PAIN SCALES - GENERAL
PAINLEVEL_OUTOF10: 0
PAINLEVEL_OUTOF10: 0
PAINLEVEL_OUTOF10: 3
PAINLEVEL_OUTOF10: 0
PAINLEVEL_OUTOF10: 0

## 2023-02-04 ASSESSMENT — PAIN DESCRIPTION - ORIENTATION: ORIENTATION: RIGHT;LEFT

## 2023-02-04 ASSESSMENT — PAIN DESCRIPTION - LOCATION: LOCATION: HIP

## 2023-02-04 NOTE — PLAN OF CARE
Problem: Discharge Planning  Goal: Discharge to home or other facility with appropriate resources  2/4/2023 1037 by Katrina Frances RN  Outcome: Progressing     Problem: Pain  Goal: Verbalizes/displays adequate comfort level or baseline comfort level  2/4/2023 1037 by Katrina Frances RN  Outcome: Progressing     Problem: Skin/Tissue Integrity  Goal: Absence of new skin breakdown  Description: 1. Monitor for areas of redness and/or skin breakdown  2. Assess vascular access sites hourly  3. Every 4-6 hours minimum:  Change oxygen saturation probe site  4. Every 4-6 hours:  If on nasal continuous positive airway pressure, respiratory therapy assess nares and determine need for appliance change or resting period. 2/4/2023 1037 by Katrina Frances RN  Outcome: Progressing     Problem: Safety - Adult  Goal: Free from fall injury  2/4/2023 1037 by Katrina Frances RN  Outcome: Progressing     Problem: Confusion  Goal: Confusion, delirium, dementia, or psychosis is improved or at baseline  Description: INTERVENTIONS:  1. Assess for possible contributors to thought disturbance, including medications, impaired vision or hearing, underlying metabolic abnormalities, dehydration, psychiatric diagnoses, and notify attending LIP  2. [place] high risk fall precautions, as indicated  3. Provide frequent short contacts to provide reality reorientation, refocusing and direction  4. Decrease environmental stimuli, including noise as appropriate  5. Monitor and intervene to maintain adequate nutrition, hydration, elimination, sleep and activity  6. If unable to ensure safety without constant attention obtain sitter and review sitter guidelines with assigned personnel  7.  Initiate Psychosocial CNS and Spiritual Care consult, as indicated  Outcome: Progressing

## 2023-02-04 NOTE — PROGRESS NOTES
[facility] Pharmacokinetic Monitoring Service - Vancomycin     Peterson Carpio is a 36 y.o. female starting on vancomycin therapy for CAP  Pharmacy consulted by  for monitoring and adjustment. Target Concentration: Goal AUC/JASMYN 400-600 mg*hr/L    Additional Antimicrobials: ZOSYN     Pertinent Laboratory Values: Wt Readings from Last 1 Encounters:   02/04/23 189 lb 2.5 oz (85.8 kg)     Temp Readings from Last 1 Encounters:   02/04/23 100.4 °F (38 °C) (Axillary)     Estimated Creatinine Clearance: 123 mL/min (based on SCr of 0.6 mg/dL).   Recent Labs     02/04/23  0035   CREATININE 0.60   WBC 23.2*     Procalcitonin:     Pertinent Cultures:  Culture Date Source Results   2/4 Mrsa nasal  positive   MRSA Nasal Swab: showed MRSA positive result on 2/4/2023    Plan:  Dosing recommendations based on Bayesian software  Start vancomycin 1250 mg   Anticipated AUC of 458 and trough concentration of 13.4 at steady state  Renal labs as indicated   Vancomycin concentration not ordered    Pharmacy will continue to monitor patient and adjust therapy as indicated    Thank you for the consult,  Amina Cowan, [address] [facility]  2/4/2023 10:50 AM

## 2023-02-04 NOTE — ED PROVIDER NOTES
[address] [facility] [place]     Emergency Department     Faculty Note/ Attestation      Pt Name: Jennifer Arellano                                       MRN: [id number]  [place] 1980  Date of evaluation: 2/3/2023    Patients PCP:    Sandra Patricio MD      Attestation  I performed a history and physical examination of the patient and discussed management with the resident. I reviewed the residents note and agree with the documented findings and plan of care. Any areas of disagreement are noted on the chart. I was personally present for the key portions of any procedures. I have documented in the chart those procedures where I was not present during the key portions. I have reviewed the emergency nurses triage note. I agree with the chief complaint, past medical history, past surgical history, allergies, medications, social and family history as documented unless otherwise noted below. For Physician Assistant/ Nurse Practitioner cases/documentation I have personally evaluated this patient and have completed at least one if not all key elements of the E/M (history, physical exam, and MDM). Additional findings are as noted.       Initial Screens:        [place] Coma Scale  Eye Opening: Spontaneous  Best Verbal Response: Oriented  Best Motor Response: Obeys commands  [place] Coma Scale Score: 15    Vitals:    Vitals:    02/03/23 2356   BP: (!) 155/83   Pulse: (!) 138   Resp: (!) 35   Temp: (!) 101.1 °F (38.4 °C)   SpO2: 98%   Weight: 200 lb (90.7 kg)   Height: 5' 2\" (1.575 m)       CHIEF COMPLAINT       Chief Complaint   Patient presents with    Shortness of Breath    Cough             DIAGNOSTIC RESULTS             RADIOLOGY:   XR CHEST PORTABLE    (Results Pending)         LABS:  Labs Reviewed   CBC WITH AUTO DIFFERENTIAL - Abnormal; Notable for the following components:       Result Value    WBC 23.2 (*)     RBC 3.36 (*)     Hemoglobin 4.5 (*)     Hematocrit 19.1 (*)     MCV 56.8 (*)     MCH 13.4 (*) MCHC 23.6 (*)     RDW 23.0 (*)     NRBC Automated 0.2 (*)     All other components within normal limits   CULTURE, BLOOD 1   CULTURE, BLOOD 1   COVID-19, RAPID   RAPID INFLUENZA A/B ANTIGENS   HCG, SERUM, QUALITATIVE   IMMATURE PLATELET FRACTION   COMPREHENSIVE METABOLIC PANEL   MAGNESIUM   BRAIN NATRIURETIC PEPTIDE   TROPONIN   D-DIMER, QUANTITATIVE   PROTIME-INR   APTT   HEMOGLOBIN AND HEMATOCRIT   TYPE AND SCREEN   PREPARE RBC (CROSSMATCH)         EMERGENCY DEPARTMENT COURSE:     -------------------------  BP: (!) 155/83, Temp: (!) 101.1 °F (38.4 °C), Heart Rate: (!) 138, Resp: (!) 35      Comments    Arrives by EMS stating that she smoked crack cocaine today, she uses cocaine regularly. She is tachycardic, febrile, tachypneic. She has psychomotor agitation, and she has a harsh productive cough with yellow sputum. IV access obtained started on saline bolus, sepsis and respiratory work-up initiated. Patient found to have a hemoglobin of 4.5, x-ray shows mass versus infiltrate in the right lower lobe, patient given 1 mg Versed due to her cocaine use. This time we will perform pelvic and rectal exam, typed and crossed for 2 units, transfuse 1, get a CTA to evaluate for PE as well as to better characterize the chest x-ray findings, will start on broad-spectrum antibiotics and certainly admit the patient to the hospital.    CRITICAL CARE: There was a high probability of clinically significant/life threatening deterioration in this patient's condition which required my urgent intervention. Total critical care time was *** minutes. This excludes any time for separately reportable procedures.        (Please note that portions of this note were completed with a voice recognition program.  Efforts were made to edit the dictations but occasionally words are mis-transcribed.)      Rico Nolen MD,, MD  Attending Emergency Physician

## 2023-02-04 NOTE — ED NOTES
Patient presents to the ER tonight with difficulty breathing. Patient arrived via ems and was on a nasal cannula at 3LPM patient seemed very jittery and stated that she had did crack cocaine earlier in the night. Patient had a heart rate in the 150s.      Temo Perdomo RN  02/04/23 [id number]

## 2023-02-04 NOTE — ED PROVIDER NOTES
[facility]  ED  Emergency Department Encounter  Emergency Medicine Resident     Pt Name: Pina Chan  MRN: [id number]  [place] 1980  Date of evaluation: 2/3/23  PCP:  Kojo Vera MD    [address] [place] [address]       Chief Complaint   Patient presents with    Shortness of Breath    Cough       HISTORY OFPRESENT ILLNESS  (Location/Symptom, Timing/Onset, Context/Setting, Quality, Duration, Modifying Factors,Severity.)      Pina Chan is a 36 y. o.yo female who presents with EMS due to shortness of breath and cough. According to the report that was given per EMS, patient was in crack and cocaine when her symptoms started. On arrival, patient complaining of persistent cough, difficulty in breathing in addition to chest pain. She did Dors to using crack and cocaine prior to arrival.  She is also complaining of vaginal bleeding. She reports a history of menorrhagia, and has been bleeding for the past 7 days however she states that her bleeding is light at this moment. She denies any abdominal pain, no nausea or vomiting. Her  who presented to states that she has not been feeling well for the past couple of days. He did go to the store to buy her Gatorade however when he came back she started complaining of chest pain and was acting abnormal and thus they were concerned and called EMS to bring her to the hospital to be evaluated. PAST MEDICAL / SURGICAL / SOCIAL / FAMILY HISTORY      has a past medical history of Crack cocaine use, Hypertension, and Shoulder pain. has a past surgical history that includes Hand surgery (2011) and Abscess Drainage (Right, 07/02/2015).      Social History     Socioeconomic History    Marital status: Single     Spouse name: Not on file    Number of children: Not on file    Years of education: Not on file    Highest education level: Not on file   Occupational History    Not on file   Tobacco Use    Smoking status: Every Day     Packs/day: 1.00 Years: 13.00     Pack years: 13.00     Types: Cigarettes    Smokeless tobacco: Never   Substance and Sexual Activity    Alcohol use: Yes     Comment: rare    Drug use: Yes     Types: Marijuana Paige Channel), Other-see comments     Comment: crack cocaine use    Sexual activity: Not on file   Other Topics Concern    Not on file   Social History Narrative    Not on file     Social Determinants of Health     Financial Resource Strain: Not on file   Food Insecurity: Not on file   Transportation Needs: Not on file   Physical Activity: Not on file   Stress: Not on file   Social Connections: Not on file   Intimate Partner Violence: Not on file   Housing Stability: Not on file       Family History   Problem Relation Age of Onset    High Blood Pressure Mother     High Blood Pressure Maternal Grandmother         Allergies:  Patient has no known allergies. Home Medications:  Prior to Admission medications    Medication Sig Start Date End Date Taking? Authorizing Provider   ibuprofen (IBU) 400 MG tablet Take 1 tablet by mouth every 6 hours as needed for Pain 1/10/21   Lam Reese MD   ibuprofen (ADVIL;MOTRIN) 800 MG tablet Take 1 tablet by mouth every 8 hours as needed for Pain 12/16/19   Zelalem Quintero MD   ferrous sulfate 325 (65 Fe) MG tablet Take 1 tablet by mouth daily (with breakfast) 8/10/19   Coby Dominguez DO   lisinopril (PRINIVIL;ZESTRIL) 5 MG tablet Take 1 tablet by mouth daily 7/9/18   ORACIO Blackburn - CNP   Menthol (CEPACOL SORE THROAT) 5.4 MG LOZG Use as needed 1/8/18   Meena Hillman MD   omeprazole (PRILOSEC) 10 MG delayed release capsule Take 1 capsule by mouth daily 2/3/17   Azalea Pepe MD   beclomethasone (QVAR) 80 MCG/ACT inhaler Inhale 2 puffs into the lungs 2 times daily    Historical Provider, MD       REVIEW OFSYSTEMS    (2-9 systems for level 4, 10 or more for level 5)      Review of Systems   Constitutional:  Positive for chills, diaphoresis and fever.    HENT:  Negative for congestion and drooling. Eyes:  Negative for photophobia and visual disturbance. Respiratory:  Positive for cough and shortness of breath. Cardiovascular:  Positive for palpitations. Gastrointestinal:  Negative for abdominal pain, nausea and vomiting. Genitourinary:  Positive for vaginal bleeding. Negative for flank pain. Skin:  Negative for rash and wound. Neurological:  Positive for light-headedness. Negative for facial asymmetry and headaches. Psychiatric/Behavioral:  Positive for confusion. PHYSICAL EXAM   (up to 7 for level 4, 8 or more forlevel 5)      INITIAL VITALS:   ED Triage Vitals   BP Temp Temp src Pulse Resp SpO2 Height Weight   -- -- -- -- -- -- -- --       Physical Exam  Constitutional:       General: She is in acute distress. Appearance: She is ill-appearing. HENT:      Head: Normocephalic and atraumatic. Nose: Nose normal.      Mouth/Throat:      Mouth: Mucous membranes are dry. Eyes:      Extraocular Movements: Extraocular movements intact. Pupils: Pupils are equal, round, and reactive to light. Comments: Patient with mydriatic pupils   Cardiovascular:      Rate and Rhythm: Tachycardia present. Pulmonary:      Effort: Tachypnea present. No respiratory distress. Breath sounds: No stridor. Examination of the right-lower field reveals decreased breath sounds. Decreased breath sounds present. No wheezing. Comments: Diminished lung sounds over the right hemithorax on the lower side  Abdominal:      General: There is no distension. Palpations: Abdomen is soft. Tenderness: There is no abdominal tenderness. Musculoskeletal:         General: No deformity or signs of injury. Cervical back: Normal range of motion. Skin:     Capillary Refill: Capillary refill takes less than 2 seconds. Findings: No bruising or erythema. Comments: Diaphoretic   Neurological:      General: No focal deficit present.       Mental Status: She is alert.   Psychiatric:      Comments: Patient continues, mildly erratic behavior       DIFFERENTIAL  DIAGNOSIS     PLAN (Andres Lizarraga / Sarina Brewer / EKG):  Orders Placed This Encounter   Procedures    Culture, Blood 1    Culture, Blood 1    COVID-19, Rapid    RAPID INFLUENZA A/B ANTIGENS    C.trachomatis N.gonorrhoeae DNA    Vaginitis DNA Probe    Culture, Urine    MRSA DNA Probe, Nasal    XR CHEST PORTABLE    CT CHEST PULMONARY EMBOLISM W CONTRAST    CBC with Auto Differential    Comprehensive Metabolic Panel    Magnesium    Brain Natriuretic Peptide    Troponin    HCG Qualitative, Serum    D-Dimer, Quantitative    Protime-INR    APTT    Immature Platelet Fraction    Hemoglobin and Hematocrit    Lactic Acid    TOX SCR, BLD, ED    Troponin    Urinalysis with Microscopic    Urine Drug Screen    TSH with Reflex    Diet NPO    Cardiac Monitor - ED Only    Continuous Pulse Oximetry    Verify hospital blood product consent form has been signed and witnessed    Vital Signs For Blood Product Transfusion    Transfusion Reaction Management    Vital signs per unit routine    Daily weights    Intake and output    Place intermittent pneumatic compression device    Admission/Observation order previously placed    Up with assistance    Full Code    Inpatient consult to Critical Care    Initiate Oxygen Therapy Protocol    Respiratory care evaluation only    EKG 12 Lead    EKG 12 Lead    TYPE AND SCREEN    PREPARE RBC (CROSSMATCH), 2 Units    Saline lock IV    ADMIT TO INPATIENT       MEDICATIONS ORDERED:  Orders Placed This Encounter   Medications    midazolam PF (VERSED) injection 1 mg    0.9 % sodium chloride bolus    ketorolac (TORADOL) injection 30 mg    0.9 % sodium chloride infusion    guaiFENesin (MUCINEX) extended release tablet 600 mg    piperacillin-tazobactam (ZOSYN) 3,375 mg in sodium chloride 0.9 % 50 mL IVPB (mini-bag)     Order Specific Question:   Antimicrobial Indications     Answer:   Pneumonia (CAP)    vancomycin (VANCOCIN) 1250 mg in sodium chloride 0.9% 250 mL IVPB     Order Specific Question:   Antimicrobial Indications     Answer:   Pneumonia (CAP)    0.9 % sodium chloride bolus    acetaminophen (TYLENOL) tablet 1,000 mg    iopamidol (ISOVUE-370) 76 % injection 75 mL    LORazepam (ATIVAN) injection 1 mg    sodium chloride flush 0.9 % injection 5-40 mL    sodium chloride flush 0.9 % injection 5-40 mL    0.9 % sodium chloride infusion    enoxaparin (LOVENOX) injection 40 mg     Order Specific Question:   Indication of Use     Answer:   Prophylaxis-DVT/PE    OR Linked Order Group     ondansetron (ZOFRAN-ODT) disintegrating tablet 4 mg     ondansetron (ZOFRAN) injection 4 mg    polyethylene glycol (GLYCOLAX) packet 17 g    OR Linked Order Group     acetaminophen (TYLENOL) tablet 650 mg     acetaminophen (TYLENOL) suppository 650 mg           Medical Decision Making  Patient nontoxic in appearance, tachycardic in the 140s, tachypneic, hypoxic, requiring supplemental oxygen, febrile. She does have mydriasis for pupils, diaphoretic. There is concern for sympathomimetic overdose given that she did take crack and cocaine prior to arrival.  We will initially treat patient with 1 mg of Versed  Patient does have a diminished breath sounds over the right lower hemithorax and is is concern for pneumonia. Will obtain portable x-ray. Will get EKG, troponin to rule out cocaine induced ischemia. Patient did mention she has a history of anemia, will get CBC to rule out any anemia given the tachycardia, hypoxia. We did also consider pulmonary embolism and thus will obtain dimer. If elevated, will get CT chest  Patient started on IV fluids  Patient to be admitted to the hospital    Amount and/or Complexity of Data Reviewed  Independent Historian: spouse and EMS  Labs: ordered. Decision-making details documented in ED Course. Radiology: ordered. ECG/medicine tests: ordered. Risk  OTC drugs. Prescription drug management.   Decision regarding hospitalization. Critical Care  Total time providing critical care: 30-74 minutes     Sepsis Times and Checklist  Vital Signs: BP: 126/64  Heart Rate: (!) 104  Resp: 29  Temp: 98.7 °F (37.1 °C) SpO2: 100 %  SIRS (>2) Non Pregnant       Temp > 38.3C or < 36C   HR > 90   RR > 20   WBC > 12 or < 4 or >10% bands  SIRS (>2) Pregnant 20 weeks until 3 days postpartum   Temp > 38C or <36C   HR >110   RR > 24   WBC >15 or < 4 or >10% bands   SIRS (>2) and confirmed or suspected source of infection = Sepsis  Is Sepsis due to likely bacterial infection?: Yes  . Sepsis Identified:  Date: 2/4/2023   Time: 12AM 2/5/2023  Sepsis Orders:   CBC(required): Yes   CMP: Yes   PT/PTT: Yes   Blood Cultures x2(required): Yes   Urinalysis and Urine Culture: Yes   Lactate(required): Yes   Antibiotics Given (within 3 hours of sepsis identification, after blood cultures):  Yes    (If unable to obtain IV access for IV antibiotics within 3 hours of identification of sepsis, IM antibiotics is acceptable.)              If lactate >2.0 MUST repeat within 6 hours NO    If elevated, is elevated lactate from a likely infectious source?:  PNA .       IV Fluid Bolus:  Is lactate > 4.0:  No      DIAGNOSTIC RESULTS / EMERGENCYDEPARTMENT COURSE / MDM     LABS:  Labs Reviewed   VAGINITIS DNA PROBE - Abnormal; Notable for the following components:       Result Value    Gardnerella Vaginalis, DNA Probe POSITIVE (*)     All other components within normal limits   CBC WITH AUTO DIFFERENTIAL - Abnormal; Notable for the following components:    WBC 23.2 (*)     RBC 3.36 (*)     Hemoglobin 4.5 (*)     Hematocrit 19.1 (*)     MCV 56.8 (*)     MCH 13.4 (*)     MCHC 23.6 (*)     RDW 23.0 (*)     NRBC Automated 0.2 (*)     Seg Neutrophils 93 (*)     Lymphocytes 3 (*)     Eosinophils % 0 (*)     Segs Absolute 21.57 (*)     Absolute Lymph # 0.70 (*)     Absolute Mono # 0.93 (*)     All other components within normal limits   COMPREHENSIVE METABOLIC PANEL - Abnormal; Notable for the following components:    Glucose 116 (*)     Sodium 133 (*)     Potassium 3.2 (*)     CO2 18 (*)     All other components within normal limits   BRAIN NATRIURETIC PEPTIDE - Abnormal; Notable for the following components:    Pro- (*)     All other components within normal limits   TROPONIN - Abnormal; Notable for the following components:    Troponin, High Sensitivity 21 (*)     All other components within normal limits   APTT - Abnormal; Notable for the following components:    PTT 19.5 (*)     All other components within normal limits   TOX SCR, BLD, ED - Abnormal; Notable for the following components:    Acetaminophen Level <5 (*)     Salicylate Lvl <1 (*)     All other components within normal limits   TROPONIN - Abnormal; Notable for the following components:    Troponin, High Sensitivity 34 (*)     All other components within normal limits   URINALYSIS WITH MICROSCOPIC - Abnormal; Notable for the following components:    Urine Hgb TRACE (*)     All other components within normal limits   URINE DRUG SCREEN - Abnormal; Notable for the following components:    Cocaine Metabolite, Urine POSITIVE (*)     All other components within normal limits   CULTURE, BLOOD 1   CULTURE, BLOOD 1   COVID-19, RAPID   RAPID INFLUENZA A/B ANTIGENS   C.TRACHOMATIS N.GONORRHOEAE DNA   CULTURE, URINE   MRSA DNA PROBE, NASAL   MAGNESIUM   HCG, SERUM, QUALITATIVE   D-DIMER, QUANTITATIVE   PROTIME-INR   IMMATURE PLATELET FRACTION   LACTIC ACID   TSH WITH REFLEX   HEMOGLOBIN AND HEMATOCRIT   TYPE AND SCREEN   PREPARE RBC (CROSSMATCH)         RADIOLOGY:  XR CHEST PORTABLE    Result Date: 2/4/2023  Mild cardiomegaly with left ventricular prominence. Pericardial effusion may not be excluded. Minimal pulmonary vascular congestion. Mild interstitial pulmonary edema. New dense pneumonia, and/or atelectatic changes in the lateral portion right lung base.   Underlying mass lesion in the right lung base is not excluded. Follow-up PA and lateral views of chest and then if clinically indicated CT scan of chest may be considered for further evaluation. RECOMMENDATION:     CT CHEST PULMONARY EMBOLISM W CONTRAST    Result Date: 2/4/2023  Limited study for visualization of pulmonary arterial system. Significant motion induced artifacts involving the pulmonary arterial and venous structures in lower lungs bilaterally. In the central pulmonary arteries and in the central branches there is no pulmonary embolism. The distal basilar branches of pulmonary arteries of both sides are poorly visualized and cannot be evaluated for any possible pulmonary embolism. No evidence of thoracic aortic aneurysm or dissection. No evidence of pleural effusion or pneumothorax. Evidence of moderate pulmonary emphysema particularly in the upper lungs bilaterally. Dense pneumonic consolidation with air bronchograms in the anterior segment and lateral segment of the lower lobe of right lung. No definable mass lesion in this area of the right lung. Mild atelectatic/fibrotic changes in the rest of lower lobes of both lungs. Findings suggestive of fatty infiltration in liver without definable focal lesion as in visualized upper and midportion of liver. EKG  EKG Interpretation    Interpreted by me    Rhythm: Sinus tachycardia  Rate: 144  Axis: normal  Ectopy: none  Conduction: normal  ST Segments: no acute change  T Waves: no acute change  Q Waves: none    Clinical Impression: Sinus tachycardia, 416    All EKG's are interpreted by the Emergency Department Physicianwho either signs or Co-signs this chart in the absence of a cardiologist.    EMERGENCY DEPARTMENT COURSE:  ED Course as of 02/04/23 0502   Sat Feb 04, 2023   0050 Hemoglobin Quant(!!): 4.5 [AN]   0050 WBC(!): 23.2 [AN]   0124 Potassium(!): 3.2 [AN]   0146 Patient anemic, globin of 4.5. She will be transfused with 2 units PRBC. She has a leukocytosis of 23,000.   Her x-ray that was done did show was concerning for consolidation versus [place] hump concerning for wedge infarct due to pulmonary embolism. He does have an elevation in her dimer, will get CT scan of her chest.  To start patient on broad-spectrum antibiotic. Initial temperature was 101.1, she did get Toradol. Repeat temperature was 103, patient given Tylenol. Her lactic is 1.1 [AN]   0147 Lactic Acid, Whole Blood: 1.1 [AN]   9338 COVID negative, flu negative. Pelvic exam negative for bleed but shows mucopurulent discharge [AN]   0301 There is a delta of greater than 5 in repeat troponin, we will repeat the patient EKG [AN]   0308 I spoke to critical care team, they are going to admit the patient [AN]      ED Course User Index  [AN] Harmeet Yarbrough MD          PROCEDURES:  None    CONSULTS:  IP CONSULT TO CRITICAL CARE    CRITICAL CARE:      FINAL IMPRESSION      1. Tachycardia    2. Fever in other diseases    3. Anemia, unspecified type    4. Hypokalemia    5. Pneumonia of right lower lobe due to infectious organism    6. Adverse effect of sympathomimetics, initial encounter          DISPOSITION / [address] Admitted 02/04/2023 03:15:05 AM      PATIENT REFERRED TO:  No follow-up provider specified.     DISCHARGE MEDICATIONS:  Current Discharge Medication List          Harmeet Yarbrough MD  Emergency Medicine Resident    (Please note that portions of this note were completed with a voice recognition program.Efforts were made to edit the dictations but occasionally words are mis-transcribed.)        Harmeet Yarbrough MD  Resident  02/04/23 [id number]

## 2023-02-04 NOTE — H&P
Critical Care - History and Physical Examination    Patient's name:  Teofilo Serra  Medical Record Number: [id number]  Patient's account/billing number: [de-identified]  Patient's YOB: 1980  Age: 36 y.o. Date of Admission: 2/3/2023 11:49 PM  Date of History and Physical Examination: 2/4/2023    Primary Care Physician: Shima Estrada MD  Attending Physician: Jacqueline Nation MD     Code Status: Prior    Chief complaint:   Chief Complaint   Patient presents with    Shortness of Breath    Cough     HISTORY OF PRESENT ILLNESS:   Teofilo Serra is a 36 y.o. female with history of asthma, hypertension presented to the ED with shortness of breath, cough, chest pain secondary to cocaine use. Patient was having a persistent cough, difficulty breathing, chest pain. In the ED she was tachycardic, tachypneic, febrile. CBC in the ED showed hemoglobin of 4.5, patient endorses a history of menorrhagia, however pelvic exam in the ED showed minimal blood. Patient states she is currently on her period. Patient also had a leukocytosis of 23.2, potassium was 3.2, troponins were mildly elevated initial one was 21, repeat 24. D-dimer was elevated at 1.0, CT PE showed no pulmonary embolus in the central pulmonary arteries, but showed dense pneumonic consolidation in the anterior segment in the lateral segment of the right lower lobe of the lung. Chest x-ray showed new dense pneumonia. Patient was given vancomycin and Zosyn in the ED. Was also given 2 units of PRBCs due to her low hemoglobin of 4.5. Ativan 1 mg and Versed was also given in the ED due to her cocaine intoxication, and she was given potassium replacement. She was also given a 2 L bolus of normal saline. On the floor, patient was agitated, patient remains tachycardic and tachypneic. Patient's oxygen requirement is coming down, currently on 1 L saturating at 99%. Upon initial evaluation there was no alexi blood that was noticed.   Patient was alert and oriented to self and place but not time. Past Medical History:        Diagnosis Date    Crack cocaine use     Hypertension     Shoulder pain        Past Surgical History:        Procedure Laterality Date    ABSCESS DRAINAGE Right 07/02/2015    thumb    HAND SURGERY  2011       Allergies:    No Known Allergies      Home Meds:   Prior to Admission medications    Medication Sig Start Date End Date Taking? Authorizing Provider   ibuprofen (IBU) 400 MG tablet Take 1 tablet by mouth every 6 hours as needed for Pain 1/10/21   Lam Moreno MD   ibuprofen (ADVIL;MOTRIN) 800 MG tablet Take 1 tablet by mouth every 8 hours as needed for Pain 12/16/19   Khari Hernandez MD   ferrous sulfate 325 (65 Fe) MG tablet Take 1 tablet by mouth daily (with breakfast) 8/10/19   Janett Perrin DO   lisinopril (PRINIVIL;ZESTRIL) 5 MG tablet Take 1 tablet by mouth daily 7/9/18   ORACIO Hennessy CNP   Menthol (CEPACOL SORE THROAT) 5.4 MG LOZG Use as needed 1/8/18   Fox Leija MD   omeprazole (PRILOSEC) 10 MG delayed release capsule Take 1 capsule by mouth daily 2/3/17   Rakel Velez MD   beclomethasone (QVAR) 80 MCG/ACT inhaler Inhale 2 puffs into the lungs 2 times daily    Historical Provider, MD       Social History:   TOBACCO:   reports that she has been smoking cigarettes. She has a 13.00 pack-year smoking history. She has never used smokeless tobacco.  ETOH:   reports current alcohol use. DRUGS:  reports current drug use. Drugs: Marijuana (Weed) and Gaming [employer] comments.     Family History:       Problem Relation Age of Onset    High Blood Pressure Mother     High Blood Pressure Maternal Grandmother        REVIEW OF SYSTEMS (ROS):  Could not be evaluated, as patient is very drowsy, and just received ativan        Physical Exam:    Vitals: /66   Pulse (!) 111   Temp 99.4 °F (37.4 °C)   Resp 30   Ht 5' 2\" (1.575 m)   Wt 200 lb (90.7 kg)   LMP 01/27/2023 (Approximate)   SpO2 100%   BMI 36.58 kg/m²     Last Body weight:   Wt Readings from Last 3 Encounters:   02/03/23 200 lb (90.7 kg)   01/10/21 205 lb (93 kg)   09/03/20 205 lb (93 kg)       Body Mass Index : Body mass index is 36.58 kg/m². PHYSICAL EXAMINATION :   Constitutional: Appears well, she is tachypneic and tachycardic  EENT: PERRLA, EOMI, sclera clear, anicteric, oropharynx clear, no lesions, neck supple with midline trachea. Neck: Supple, symmetrical, trachea midline, no adenopathy, thyroid symmetric, no jvd skin normal  Respiratory: Patient is tachypneic, decreased breath sounds on the right lower side  Cardiovascular: regular rate and rhythm, normal S1, S2, no murmur noted and 2+ pulses throughout  Abdomen: soft, nontender, nondistended, no masses or organomegaly  Extremities:  peripheral pulses normal, no pedal edema, no clubbing or cyanosis    MEDICATIONS:  Scheduled Meds:   vancomycin  15 mg/kg IntraVENous Once    sodium chloride  1,000 mL IntraVENous Once       Continuous Infusions:   sodium chloride         PRN Meds:   sodium chloride, , PRN        SUPPORT DEVICES: [] Ventilator [] BIPAP  [] Nasal Cannula [] Room Air    VENT SETTINGS (Comprehensive) (if applicable):    LABS:     CBC:   Recent Labs     02/04/23 0035   WBC 23.2*   HGB 4.5*   PLT See Reflexed IPF Result     BMP:    Recent Labs     02/04/23 0035   *   K 3.2*      CO2 18*   BUN 8   CREATININE 0.60   GLUCOSE 116*     S. Calcium:  Recent Labs     02/04/23 0035   CALCIUM 9.1     S. Ionized Calcium:No results for input(s): IONCA in the last 72 hours. S. Magnesium:  Recent Labs     02/04/23 0035   MG 1.6     S. Phosphorus:No results for input(s): PHOS in the last 72 hours. S. Glucose:No results for input(s): POCGLU in the last 72 hours. Glycosylated hemoglobin A1C: No results for input(s): LABA1C in the last 72 hours.   INR:   Recent Labs     02/04/23 0035   INR 1.1     Hepatic functions:   Recent Labs     02/04/23 0035   ALKPHOS 68   ALT 13   AST 23   PROT 8.0   BILITOT 0.9   LABALBU 4.2     Pancreatic functions:No results for input(s): LACTA, AMYLASE in the last 72 hours. S. Lactic Acid: No results for input(s): LACTA in the last 72 hours. Cardiac enzymes:No results for input(s): CKTOTAL, CKMB, CKMBINDEX, TROPONINI in the last 72 hours. BNP:No results for input(s): BNP in the last 72 hours. Lipid profile: No results for input(s): CHOL, TRIG, HDL, LDL, LDLCALC in the last 72 hours. Blood Gases: No results found for: PH, PCO2, PO2, HCO3, O2SAT  Thyroid functions: No results found for: T4, TSH     Urinalysis:     Urinalysis was negative in the ED    Microbiology: Cultures during this admission:     Blood cultures:                 [] None drawn      [] Negative             []  Positive (Details:  )  Urine Culture:                   [] None drawn      [] Negative             []  Positive (Details:  )  Sputum Culture:               [] None drawn       [] Negative             []  Positive (Details:  )   Endotracheal aspirate:     [] None drawn       [] Negative             []  Positive (Details:  )     RADIOLOGY:  CT CHEST PULMONARY EMBOLISM W CONTRAST   Preliminary Result   Limited study for visualization of pulmonary arterial system. Significant   motion induced artifacts involving the pulmonary arterial and venous   structures in lower lungs bilaterally. In the central pulmonary arteries and in the central branches there is no   pulmonary embolism. The distal basilar branches of pulmonary arteries of   both sides are poorly visualized and cannot be evaluated for any possible   pulmonary embolism. No evidence of thoracic aortic aneurysm or dissection. No evidence of pleural effusion or pneumothorax. Evidence of moderate   pulmonary emphysema particularly in the upper lungs bilaterally. Dense pneumonic consolidation with air bronchograms in the anterior segment   and lateral segment of the lower lobe of right lung.   No definable mass lesion in this area of the right lung. Mild atelectatic/fibrotic changes in the rest of lower lobes of both lungs. Findings suggestive of fatty infiltration in liver without definable focal   lesion as in visualized upper and midportion of liver. XR CHEST PORTABLE   Preliminary Result   Mild cardiomegaly with left ventricular prominence. Pericardial effusion may   not be excluded. Minimal pulmonary vascular congestion. Mild interstitial   pulmonary edema. New dense pneumonia, and/or atelectatic changes in the lateral portion right   lung base. Underlying mass lesion in the right lung base is not excluded. Follow-up PA and lateral views of chest and then if clinically indicated CT   scan of chest may be considered for further evaluation. RECOMMENDATION:             CARDIOLOGY:  Recent Cardiac Catheterization summary:   No results found for this or any previous visit. Electrocardiogram:   Sinus tachycardia    Last Echocardiogram findings:   No results found for this or any previous visit. No results found for this or any previous visit.       Assessment and Plan     Patient Active Problem List   Diagnosis    Shoulder pain    Abscess of left hand    Abscess, hand    Cellulitis of hand    Streptococcal pharyngitis    Sepsis due to group A Streptococcus (HCC)    Polysubstance abuse (HCC)    Crack cocaine use    Symptomatic anemia    Iron deficiency anemia    Hypokalemia    Crack cocaine overdose, accidental or unintentional, initial encounter ([facility] [address]       PLAN/MEDICAL DECISION MAKING:  Neurologic:   - Crack cocaine intoxication  - Neuro checks per protocol  -UDS was positive for cocaine  -We will avoid beta-blockers and will use Ativan as needed for agitation  -     Cardiovascular:  - Hypertension   -Blood pressure has been stable in the ED  -Patient is tachycardic and tachypneic, will use Ativan as needed  -Initial troponin was 21, repeat 24, will continue to trend most likely type II NSTEMI due to demand ischemia from the cocaine use       Pulmonary:  - Maintain oxygen sats >92%  - Pulmonary toilet  -Chest x-ray showed new dense pneumonia in the lateral portion of the right lung base.   -CT PE showed no evidence of pulmonary embolus in the central pulmonary arteries and in the central branches, however it showed a dense pneumonic consolidation in the anterior segment and lateral segment of the lower lobe of the right lung   -We will obtain strep pneumo, mycoplasma, Legionella, and Pro-Raul  -Patient received Vanco and Zosyn in the ED  -Currently on 2 L nasal cannula saturating at 100%    GI/Nutrition:  - Bowel regimen:  - Diet:No diet orders on file    Renal/Fluid/Electrolyte:  - IV Fluids:   - I/O: In: 50   Out: -   - Hypokalemia 3.2, repleted  -Mild hyponatremia, 133, monitor    ID:  _ WBC:   Lab Results   Component Value Date    WBC 23.2 (H) 2023     - Tmax: Temp (24hrs), Av.6 °F (38.7 °C), Min:99.4 °F (37.4 °C), Max:103.2 °F (39.6 °C)  - Follow-up pelvic swabs  - Was given vancomycin and zozsyn in the ed  -We will continue Zosyn, also obtained Pro-Raul, sputum culture, mycoplasma, strep pneumo and Legionella  MRSA swab POSITIVE, resumed vancomycin  -Blood cultures collected, are negative    Hematology:  -2 units PRBC  -Patient reports vaginal bleeding but no bleeding on exam  -We will follow-up on repeat hemoglobin posttransfusion, will obtain iron studies including ferritin, iron and TIBC  - Hemoglobin post transfusion 6.5, iron and iron saturation decreased  Recent Labs     23  0035   HGB 4.5*      Endocrine:   - Glucose controlled - most recent BGL is   -Patient has no history of diabetes  Recent Labs     23  0035   GLUCOSE 116*   -Follow-up unremarkable      Prophylaxis:  - DVT prophylaxis: EPC's only due to anemia  - GI prophylaxis: N/A    Lines/drains:  -External urinary catheter, 2 peripheral IVs    DISPOSITION:  [] To remain ICU  [] OK for out of ICU from Critical Care standpoint    Hemostatic  Family medicine resident, PGY 2  Attending Physician Statement  I have discussed the care of Mackey Landau, including pertinent history and exam findings,  with the resident. I have seen and examined the patient and the key elements of all parts of the encounter have been performed by me. I agree with the assessment, plan and orders as documented by the resident with additions . Sepsis   Right lower lobe pneumonia CAP   Cocaine abuse   Alcohol abuse   Anemia of blood loss due to menorrhagia post transfusion   Copd emphysema   Plan   Monitor for drug withdrawal   Zosyn and vancomycin   Follow cultures   Iron studies   Monitor hb   Iv fluids at 50 ml/hr   Thiamine and folate     Epc cuffs    Will need ob eval once stable for evaluation and tt for menorrhagia . Total critical care time caring for this patient with life threatening, unstable organ failure, including direct patient contact, management of life support systems, review of data including imaging and labs, discussions with other team members and physicians at least 28   Min so far today, excluding procedures. Electronically signed by Maddy Gray MD on   2/4/23 at 7:56 PM EST    Please note that this chart was generated using voice recognition Dragon dictation software. Although every effort was made to ensure the accuracy of this automated transcription, some errors in transcription may have occurred.

## 2023-02-04 NOTE — CONSENT
Informed Consent for Blood Component Transfusion Note    I have discussed with the patient the rationale for blood component transfusion; its benefits in treating or preventing fatigue, organ damage, or death; and its risk which includes mild transfusion reactions, rare risk of blood borne infection, or more serious but rare reactions. I have discussed the alternatives to transfusion, including the risk and consequences of not receiving transfusion. The patient had an opportunity to ask questions and had agreed to proceed with transfusion of blood components.     Electronically signed by Stevie Michel MD on 2/4/23 at 2:00 AM EST

## 2023-02-04 NOTE — FLOWSHEET NOTE
Pt's brother and daughter called for update. Writer asked the patient if updates can be given and the patient agreed. Writing down call back number of the callers since the patient's father's phone number is not valid per  notes.     Anahi Brower (Daughter)  [phone]    Ruiz Fisher [phone]

## 2023-02-05 LAB
ABSOLUTE EOS #: 0 K/UL (ref 0–0.4)
ABSOLUTE IMMATURE GRANULOCYTE: 0 K/UL (ref 0–0.3)
ABSOLUTE LYMPH #: 1.52 K/UL (ref 1–4.8)
ABSOLUTE MONO #: 1.82 K/UL (ref 0.1–0.8)
ANION GAP SERPL CALCULATED.3IONS-SCNC: 10 MMOL/L (ref 9–17)
ANION GAP SERPL CALCULATED.3IONS-SCNC: 8 MMOL/L (ref 9–17)
BASOPHILS # BLD: 0 % (ref 0–2)
BASOPHILS ABSOLUTE: 0 K/UL (ref 0–0.2)
BUN SERPL-MCNC: 7 MG/DL (ref 6–20)
CALCIUM SERPL-MCNC: 8.7 MG/DL (ref 8.6–10.4)
CHLORIDE SERPL-SCNC: 106 MMOL/L (ref 98–107)
CHLORIDE SERPL-SCNC: 107 MMOL/L (ref 98–107)
CO2 SERPL-SCNC: 19 MMOL/L (ref 20–31)
CO2 SERPL-SCNC: 21 MMOL/L (ref 20–31)
CREAT SERPL-MCNC: 0.46 MG/DL (ref 0.5–0.9)
EKG ATRIAL RATE: 139 BPM
EKG P AXIS: 52 DEGREES
EKG P-R INTERVAL: 144 MS
EKG Q-T INTERVAL: 274 MS
EKG QRS DURATION: 88 MS
EKG QTC CALCULATION (BAZETT): 416 MS
EKG R AXIS: 63 DEGREES
EKG T AXIS: 38 DEGREES
EKG VENTRICULAR RATE: 139 BPM
EOSINOPHILS RELATIVE PERCENT: 0 % (ref 1–4)
GFR SERPL CREATININE-BSD FRML MDRD: >60 ML/MIN/1.73M2
GLUCOSE SERPL-MCNC: 113 MG/DL (ref 70–99)
HCT VFR BLD AUTO: 25.9 % (ref 36.3–47.1)
HCT VFR BLD AUTO: 26.8 % (ref 36.3–47.1)
HCT VFR BLD AUTO: 27.4 % (ref 36.3–47.1)
HGB BLD-MCNC: 7.1 G/DL (ref 11.9–15.1)
HGB BLD-MCNC: 7.2 G/DL (ref 11.9–15.1)
HGB BLD-MCNC: 7.3 G/DL (ref 11.9–15.1)
IMMATURE GRANULOCYTES: 0 %
L PNEUMO1 AG UR QL IA.RAPID: NEGATIVE
LYMPHOCYTES # BLD: 5 % (ref 24–44)
MAGNESIUM SERPL-MCNC: 1.9 MG/DL (ref 1.6–2.6)
MAGNESIUM SERPL-MCNC: 2.5 MG/DL (ref 1.6–2.6)
MCH RBC QN AUTO: 17.8 PG (ref 25.2–33.5)
MCHC RBC AUTO-ENTMCNC: 27.4 G/DL (ref 28.4–34.8)
MCV RBC AUTO: 64.8 FL (ref 82.6–102.9)
MICROORGANISM SPEC CULT: ABNORMAL
MICROORGANISM SPEC CULT: NORMAL
MICROORGANISM/AGENT SPEC: ABNORMAL
MONOCYTES # BLD: 6 % (ref 1–7)
MORPHOLOGY: ABNORMAL
NRBC AUTOMATED: 0.1 PER 100 WBC
PDW BLD-RTO: 30.6 % (ref 11.8–14.4)
PLATELET # BLD AUTO: ABNORMAL K/UL (ref 138–453)
PLATELET, FLUORESCENCE: 370 K/UL (ref 138–453)
PLATELET, IMMATURE FRACTION: 3.8 % (ref 1.1–10.3)
POTASSIUM SERPL-SCNC: 3 MMOL/L (ref 3.7–5.3)
POTASSIUM SERPL-SCNC: 4 MMOL/L (ref 3.7–5.3)
RBC # BLD: 4 M/UL (ref 3.95–5.11)
SEG NEUTROPHILS: 89 % (ref 36–66)
SEGMENTED NEUTROPHILS ABSOLUTE COUNT: 26.96 K/UL (ref 1.8–7.7)
SODIUM SERPL-SCNC: 135 MMOL/L (ref 135–144)
SODIUM SERPL-SCNC: 136 MMOL/L (ref 135–144)
SOURCE: NORMAL
SPECIMEN DESCRIPTION: ABNORMAL
SPECIMEN DESCRIPTION: NORMAL
STREP PNEUMONIAE ANTIGEN: NEGATIVE
VANCOMYCIN RANDOM DATE LAST DOSE: NORMAL
VANCOMYCIN RANDOM DOSE AMOUNT: NORMAL
VANCOMYCIN RANDOM TIME LAST DOSE: NORMAL
VANCOMYCIN RANDOM: 12.4 UG/ML
WBC # BLD AUTO: 30.3 K/UL (ref 3.5–11.3)

## 2023-02-05 PROCEDURE — 1200000000 HC SEMI PRIVATE

## 2023-02-05 PROCEDURE — 2580000003 HC RX 258

## 2023-02-05 PROCEDURE — 80051 ELECTROLYTE PANEL: CPT

## 2023-02-05 PROCEDURE — 6370000000 HC RX 637 (ALT 250 FOR IP)

## 2023-02-05 PROCEDURE — 99291 CRITICAL CARE FIRST HOUR: CPT | Performed by: INTERNAL MEDICINE

## 2023-02-05 PROCEDURE — 6370000000 HC RX 637 (ALT 250 FOR IP): Performed by: INTERNAL MEDICINE

## 2023-02-05 PROCEDURE — 2580000003 HC RX 258: Performed by: STUDENT IN AN ORGANIZED HEALTH CARE EDUCATION/TRAINING PROGRAM

## 2023-02-05 PROCEDURE — 80048 BASIC METABOLIC PNL TOTAL CA: CPT

## 2023-02-05 PROCEDURE — 6360000002 HC RX W HCPCS

## 2023-02-05 PROCEDURE — 85055 RETICULATED PLATELET ASSAY: CPT

## 2023-02-05 PROCEDURE — 85025 COMPLETE CBC W/AUTO DIFF WBC: CPT

## 2023-02-05 PROCEDURE — 80202 ASSAY OF VANCOMYCIN: CPT

## 2023-02-05 PROCEDURE — 2500000003 HC RX 250 WO HCPCS

## 2023-02-05 PROCEDURE — 94640 AIRWAY INHALATION TREATMENT: CPT

## 2023-02-05 PROCEDURE — 94761 N-INVAS EAR/PLS OXIMETRY MLT: CPT

## 2023-02-05 PROCEDURE — 6360000002 HC RX W HCPCS: Performed by: STUDENT IN AN ORGANIZED HEALTH CARE EDUCATION/TRAINING PROGRAM

## 2023-02-05 PROCEDURE — 36415 COLL VENOUS BLD VENIPUNCTURE: CPT

## 2023-02-05 PROCEDURE — 83735 ASSAY OF MAGNESIUM: CPT

## 2023-02-05 PROCEDURE — 6360000002 HC RX W HCPCS: Performed by: INTERNAL MEDICINE

## 2023-02-05 PROCEDURE — 51798 US URINE CAPACITY MEASURE: CPT

## 2023-02-05 PROCEDURE — 93010 ELECTROCARDIOGRAM REPORT: CPT | Performed by: INTERNAL MEDICINE

## 2023-02-05 PROCEDURE — 6370000000 HC RX 637 (ALT 250 FOR IP): Performed by: STUDENT IN AN ORGANIZED HEALTH CARE EDUCATION/TRAINING PROGRAM

## 2023-02-05 PROCEDURE — 85014 HEMATOCRIT: CPT

## 2023-02-05 PROCEDURE — 85018 HEMOGLOBIN: CPT

## 2023-02-05 PROCEDURE — 2580000003 HC RX 258: Performed by: INTERNAL MEDICINE

## 2023-02-05 RX ORDER — MAGNESIUM SULFATE IN WATER 40 MG/ML
2000 INJECTION, SOLUTION INTRAVENOUS ONCE
Status: COMPLETED | OUTPATIENT
Start: 2023-02-05 | End: 2023-02-05

## 2023-02-05 RX ORDER — MULTIVITAMIN WITH IRON
1 TABLET ORAL DAILY
Status: DISCONTINUED | OUTPATIENT
Start: 2023-02-05 | End: 2023-02-07 | Stop reason: HOSPADM

## 2023-02-05 RX ORDER — POTASSIUM CHLORIDE 7.45 MG/ML
10 INJECTION INTRAVENOUS
Status: COMPLETED | OUTPATIENT
Start: 2023-02-05 | End: 2023-02-05

## 2023-02-05 RX ADMIN — SODIUM CHLORIDE, PRESERVATIVE FREE 10 ML: 5 INJECTION INTRAVENOUS at 20:47

## 2023-02-05 RX ADMIN — IPRATROPIUM BROMIDE AND ALBUTEROL SULFATE 1 AMPULE: 2.5; .5 SOLUTION RESPIRATORY (INHALATION) at 08:51

## 2023-02-05 RX ADMIN — MAGNESIUM SULFATE HEPTAHYDRATE 2000 MG: 40 INJECTION, SOLUTION INTRAVENOUS at 06:14

## 2023-02-05 RX ADMIN — FENTANYL CITRATE 50 MCG: 50 INJECTION, SOLUTION INTRAMUSCULAR; INTRAVENOUS at 07:26

## 2023-02-05 RX ADMIN — Medication 1500 MG: at 13:47

## 2023-02-05 RX ADMIN — VANCOMYCIN HYDROCHLORIDE 1250 MG: 10 INJECTION, POWDER, LYOPHILIZED, FOR SOLUTION INTRAVENOUS at 03:13

## 2023-02-05 RX ADMIN — FENTANYL CITRATE 50 MCG: 50 INJECTION, SOLUTION INTRAMUSCULAR; INTRAVENOUS at 03:10

## 2023-02-05 RX ADMIN — POTASSIUM CHLORIDE 10 MEQ: 7.46 INJECTION, SOLUTION INTRAVENOUS at 06:10

## 2023-02-05 RX ADMIN — ALCOHOL 1 TABLET: 70.47 GEL TOPICAL at 15:11

## 2023-02-05 RX ADMIN — ACETAMINOPHEN 650 MG: 325 TABLET ORAL at 20:08

## 2023-02-05 RX ADMIN — THIAMINE HYDROCHLORIDE: 100 INJECTION, SOLUTION INTRAMUSCULAR; INTRAVENOUS at 09:37

## 2023-02-05 RX ADMIN — SODIUM CHLORIDE, PRESERVATIVE FREE 10 ML: 5 INJECTION INTRAVENOUS at 07:29

## 2023-02-05 RX ADMIN — IPRATROPIUM BROMIDE AND ALBUTEROL SULFATE 1 AMPULE: 2.5; .5 SOLUTION RESPIRATORY (INHALATION) at 16:45

## 2023-02-05 RX ADMIN — PIPERACILLIN AND TAZOBACTAM 3375 MG: 3; .375 INJECTION, POWDER, FOR SOLUTION INTRAVENOUS at 08:24

## 2023-02-05 RX ADMIN — POTASSIUM CHLORIDE 10 MEQ: 7.46 INJECTION, SOLUTION INTRAVENOUS at 07:29

## 2023-02-05 RX ADMIN — PIPERACILLIN AND TAZOBACTAM 3375 MG: 3; .375 INJECTION, POWDER, FOR SOLUTION INTRAVENOUS at 16:01

## 2023-02-05 RX ADMIN — ONDANSETRON 4 MG: 2 INJECTION INTRAMUSCULAR; INTRAVENOUS at 20:47

## 2023-02-05 RX ADMIN — PIPERACILLIN AND TAZOBACTAM 3375 MG: 3; .375 INJECTION, POWDER, FOR SOLUTION INTRAVENOUS at 00:16

## 2023-02-05 RX ADMIN — FENTANYL CITRATE 50 MCG: 50 INJECTION, SOLUTION INTRAMUSCULAR; INTRAVENOUS at 18:02

## 2023-02-05 RX ADMIN — POTASSIUM CHLORIDE 10 MEQ: 7.46 INJECTION, SOLUTION INTRAVENOUS at 08:30

## 2023-02-05 RX ADMIN — POTASSIUM BICARBONATE 40 MEQ: 782 TABLET, EFFERVESCENT ORAL at 06:08

## 2023-02-05 ASSESSMENT — PAIN SCALES - GENERAL
PAINLEVEL_OUTOF10: 4
PAINLEVEL_OUTOF10: 7
PAINLEVEL_OUTOF10: 7
PAINLEVEL_OUTOF10: 0
PAINLEVEL_OUTOF10: 8

## 2023-02-05 ASSESSMENT — PAIN DESCRIPTION - LOCATION
LOCATION: SHOULDER
LOCATION: SHOULDER
LOCATION: HEAD

## 2023-02-05 ASSESSMENT — PAIN DESCRIPTION - ORIENTATION
ORIENTATION: RIGHT
ORIENTATION: RIGHT

## 2023-02-05 ASSESSMENT — PAIN - FUNCTIONAL ASSESSMENT
PAIN_FUNCTIONAL_ASSESSMENT: ACTIVITIES ARE NOT PREVENTED
PAIN_FUNCTIONAL_ASSESSMENT: ACTIVITIES ARE NOT PREVENTED

## 2023-02-05 ASSESSMENT — PAIN DESCRIPTION - ONSET: ONSET: GRADUAL

## 2023-02-05 ASSESSMENT — PAIN DESCRIPTION - DESCRIPTORS
DESCRIPTORS: ACHING
DESCRIPTORS: DISCOMFORT
DESCRIPTORS: DISCOMFORT

## 2023-02-05 ASSESSMENT — PAIN DESCRIPTION - PAIN TYPE: TYPE: ACUTE PAIN

## 2023-02-05 ASSESSMENT — PAIN DESCRIPTION - FREQUENCY: FREQUENCY: INTERMITTENT

## 2023-02-05 NOTE — PROGRESS NOTES
[address] [facility] Pharmacokinetic Monitoring Service - Vancomycin    Consulting Provider: Dr Promise Jama  Indication: CAP  Target Concentration: Goal AUC/JASMYN 400-600 mg*hr/L  Day of Therapy: 2  Additional Antimicrobials: ZOSYN    Pertinent Laboratory Values: Wt Readings from Last 1 Encounters:   02/05/23 189 lb 2.5 oz (85.8 kg)     Temp Readings from Last 1 Encounters:   02/05/23 98.6 °F (37 °C) (Oral)     Estimated Creatinine Clearance: 160 mL/min (A) (based on SCr of 0.46 mg/dL (L)). Recent Labs     02/04/23  0035 02/05/23  0241   CREATININE 0.60 0.46*   WBC 23.2* 30.3*     Procalcitonin: 1.73 2/4    Pertinent Cultures:  Culture Date Source Results   2/4 URINE Antigen   STREP PNEUMO NEG  LEGIONELLA NEG     2/4 blood No growth x 1 day   2/4 Nasal swab +MRSA   2/4 VAGINAL +gardnerella   MRSA Nasal Swab: showed MRSA positive result on 2/4    Recent vancomycin administrations                     vancomycin (VANCOCIN) 1250 mg in sodium chloride 0.9% 250 mL IVPB (mg) 1,250 mg New Bag 02/05/23 0313     1,250 mg New Bag 02/04/23 1408    vancomycin (VANCOCIN) 1250 mg in sodium chloride 0.9% 250 mL IVPB (mg) 1,250 mg New Bag 02/04/23 0257                    Assessment:  Date/Time Current Dose Concentration Timing of Concentration (h) AUC   2/5 1250 mg q 12h 12.4  6 hr post dose  408   Note: Serum concentrations collected for AUC dosing may appear elevated if collected in close proximity to the dose administered, this is not necessarily an indication of toxicity    Plan:  Current dosing regimen is okay, but bc of pneumonia, I would increase dose to 1500 mg q 12 hr , to get trough of 14.2 and . RENAL function is adequate.   Increase dose to 1500 mg q 12 hours   Repeat vancomycin concentration ordered TBD   Pharmacy will continue to monitor patient and adjust therapy as indicated    Thank you for the consult,  Richie Mejia [facility]  2/5/2023 12:06 PM

## 2023-02-05 NOTE — PROGRESS NOTES
SPIRITUAL CARE DEPARTMENT - [address]  PROGRESS NOTE    Shift date: 2/5/2023  Shift day: Sunday   Shift # 1    Room # 9257/4650-04   Name: Tay Perez                Sikhism: Unknown   Place of Buddhist: Unknown    Referral:  Physician: Keya Silveira Date & Time: 2/3/2023 11:49 PM    Assessment:  Tay Perez is a 36 y.o. female in the hospital because crack cocaine overdose, accidental or intentional, initial encounter. Upon entering the room writer observes patient is sitting up in bed. Patient is shaking and appears to be struggling with her breathing some. Patient's significant other is sitting in a chair by the bed. Intervention:  Writer introduced self and title as  Writer offered space for patient  to express feelings, needs, and concerns and provided a ministry presence.  says that he is there because of a desire to fill out ACP Documents. Patient says yes and that she would like to name her daughter and her significant other. Outcome:  Patient completed ACP Documents. Plan:  Chaplains will remain available to offer spiritual and emotional support as needed. Electronically signed by Bria Payan, on 2/5/2023 at 3:19 PM.  [facility]  [phone]       02/05/23 1090   Encounter Summary   Service Provided For: Patient;Significant other   Referral/Consult From: Physician   Support System Significant other;Children   Last Encounter  02/05/23   Complexity of Encounter Moderate   Begin Time 1405   End Time  1425   Total Time Calculated 20 min   Advance Care Planning   Type ACP conversation; Completed AD/ACP document(s)   Assessment/Intervention/Outcome   Assessment Coping; Anxious; Concerns with suffering   Intervention Active listening;Sustaining Presence/Ministry of presence   Outcome Comfort;Expressed Gratitude;Coping;Connection/Belonging

## 2023-02-05 NOTE — PROGRESS NOTES
INTENSIVE CARE UNIT  Resident Physician Progress Note    Patient - Francesca Garg  Date of Admission -  2/3/2023 11:49 PM  Date of Evaluation -  2023  Room and Bed Number -  3024/3024-01   Hospital Day - 1  Chief Complaint   Patient presents with    Shortness of Breath    Cough        SUBJECTIVE:     OVERNIGHT EVENTS:        Patient seen examined at bedside. No acute events reported overnight. Patient's vitals have been stable, tachycardia and tachypnea improving. Patient received 2 doses of fentanyl overnight, 1 at 3a. m. and 1 at 7 AM.  Patient did not receive any Ativan. Patient this morning was alert and oriented x4. Patient was complaining of right-sided chest pain most likely secondary to pneumonia, is also having a cough. Patient was placed on 1 L nasal cannula, currently saturating well. Blood cultures remain negative, Legionella, strep pneumo also negative, urine culture shows no significant growth, respiratory culture showed mixed bacterial morphotypes on gram stain, however culture still pending, respiratory panel came back negative. Patient is on vancomycin and Zosyn for her pneumonia. Patient's hemoglobin after the third unit of PRBCs was 7.8, this morning it is stable at 7.1. Patient received 40 mEq of potassium tablet this morning, and is also receiving 40 mEq of IV. Patient states her last drink was the day before she came in, states she does not drink every day, however when she drinks she drinks about 24 ounces of beer and a pint of wine.         TODAY:      AWAKE & FOLLOWING COMMANDS:  [] No   [x] Yes      OBJECTIVE:     VITAL SIGNS:  BP (!) 140/88   Pulse 85   Temp 98.4 °F (36.9 °C) (Oral)   Resp 28   Ht 5' 2\" (1.575 m)   Wt 189 lb 2.5 oz (85.8 kg)   LMP 2023 (Approximate)   SpO2 100%   BMI 34.60 kg/m²   Tmax over 24 hours:  Temp (24hrs), Av.6 °F (37.6 °C), Min:98.2 °F (36.8 °C), Max:100.4 °F (38 °C)      Patient Vitals for the past 8 hrs:   BP Temp Temp src Pulse Resp SpO2 Weight   02/05/23 0830 (!) 140/88 -- -- 85 28 100 % --   02/05/23 0800 (!) 147/83 -- -- 94 30 96 % --   02/05/23 0747 -- -- -- 92 -- 93 % --   02/05/23 0739 -- -- -- -- -- (!) 89 % --   02/05/23 0730 (!) 151/135 98.4 °F (36.9 °C) Oral 88 19 95 % --   02/05/23 0715 -- -- -- (!) 118 27 98 % --   02/05/23 0700 -- -- -- 91 (!) 32 97 % --   02/05/23 0644 -- -- -- -- -- -- 189 lb 2.5 oz (85.8 kg)   02/05/23 0600 (!) 146/89 -- -- 88 18 94 % --   02/05/23 0545 (!) 140/88 -- -- 88 (!) 32 95 % --   02/05/23 0500 -- -- -- 93 28 96 % --   02/05/23 0400 -- 98.2 °F (36.8 °C) Oral 97 28 97 % --   02/05/23 0310 135/78 -- -- 99 20 -- --   02/05/23 0300 -- -- -- 93 25 96 % --   02/05/23 0200 126/79 99.5 °F (37.5 °C) Oral 98 29 100 % --   02/05/23 0100 134/80 -- -- (!) 104 (!) 33 100 % --         Intake/Output Summary (Last 24 hours) at 2/5/2023 0851  Last data filed at 2/5/2023 0830  Gross per 24 hour   Intake 3487.25 ml   Output 1590 ml   Net 1897.25 ml     Date 02/05/23 0000 - 02/05/23 2359   Shift 5715-0388 2399-6561 4719-1913 24 Hour Total   INTAKE   P.O.(mL/kg/hr) 200(0.3)   200   I. V.(mL/kg) 606. 6(7.1) 13(0.2)  619.7(7.2)   IV Piggyback(mL/kg) 456. 3(5.3) 118.6(1.4)  574. 9(6.7)   Shift Total(mL/kg) 1262. 9(14.7) 131.7(1.5)  1394. 5(16.3)   OUTPUT   Urine(mL/kg/hr) 250(0.4)   250   Shift Total(mL/kg) 250(2.9)   250(2.9)   Weight (kg) 85.8 85.8 85.8 85.8     Wt Readings from Last 3 Encounters:   02/05/23 189 lb 2.5 oz (85.8 kg)   01/10/21 205 lb (93 kg)   09/03/20 205 lb (93 kg)     Body mass index is 34.6 kg/m².         PHYSICAL EXAM:  GEN:  awake, alert, cooperative, no apparent distress, and appears stated age  EYES:  Lids and lashes normal, pupils equal, round and reactive to light, extra ocular muscles intact, sclera clear, conjunctiva normal  HEENT:  Normocephalic, without obvious abnormality, atramatic, sinuses nontender on palpation, external ears without lesions, oral pharynx with moist mucus membranes, tonsils without erythema or exudates, gums normal and good dentition. LUNGS:  crackles right post and axilla  and right middle lobe, rhonchi right apex and right middle lobe, and wheeze right apex and right anterior  CV:    Normal apical impulse, regular rate and rhythm, normal S1 and S2, no S3 or S4, and no murmur noted  ABDOMEN:   No scars, normal bowel sounds, soft, non-distended, non-tender, no masses palpated, no hepatosplenomegaly  EXTREMITIES:  No pedal or leg edema, no calf tenderness/swelling, no erythema, distal pulses intact       MEDICATIONS:  Scheduled Meds:   sodium chloride flush  5-40 mL IntraVENous 2 times per day    [Held by provider] enoxaparin  40 mg SubCUTAneous Daily    piperacillin-tazobactam  3,375 mg IntraVENous Q8H    vancomycin (VANCOCIN) intermittent dosing (placeholder)   Other RX Placeholder    thiamine and folic acid IVPB   IntraVENous Daily    ipratropium-albuterol  1 ampule Inhalation Q4H WA    vancomycin  1,250 mg IntraVENous Q12H     Continuous Infusions:   sodium chloride 10 mL/hr at 02/05/23 0830    sodium chloride 50 mL/hr at 02/05/23 0830    sodium chloride       PRN Meds:   sodium chloride flush, 5-40 mL, PRN  sodium chloride, , PRN  ondansetron, 4 mg, Q8H PRN   Or  ondansetron, 4 mg, Q6H PRN  polyethylene glycol, 17 g, Daily PRN  acetaminophen, 650 mg, Q6H PRN   Or  acetaminophen, 650 mg, Q6H PRN  LORazepam, 2 mg, Q4H PRN  sodium chloride, , PRN  fentanNYL, 50 mcg, Q4H PRN        SUPPORT DEVICES: [] Ventilator [] BIPAP  [] Nasal Cannula [] Room Air    VENT SETTINGS (Comprehensive) (if applicable):        Additional Respiratory Assessments  Heart Rate: 85  Resp: 28  SpO2: 100 %    ABGs:     Lab Results   Component Value Date/Time    FIO2 NOT REPORTED 08/07/2019 05:57 PM         DATA:  Complete Blood Count:   Recent Labs     02/04/23  0035 02/04/23  0919 02/04/23  1429 02/04/23  1924 02/05/23  0241   WBC 23.2*  --   --   --  30.3*   RBC 3.36*  --   --   --  4.00   HGB 4.5*   < > 7.8* 7.1* 7.1*   HCT 19.1*   < > 28.8* 25.9* 25.9*   MCV 56.8*  --   --   --  64.8*   MCH 13.4*  --   --   --  17.8*   MCHC 23.6*  --   --   --  27.4*   RDW 23.0*  --   --   --  30.6*   PLT See Reflexed IPF Result  --   --   --  See Reflexed IPF Result    < > = values in this interval not displayed.         Last 3 Blood Glucose:   Recent Labs     02/04/23 0035 02/05/23 0241   GLUCOSE 116* 113*        PT/INR:    Lab Results   Component Value Date/Time    PROTIME 11.5 02/04/2023 12:35 AM    INR 1.1 02/04/2023 12:35 AM     PTT:    Lab Results   Component Value Date/Time    APTT 19.5 02/04/2023 12:35 AM       Comprehensive Metabolic Profile:   Recent Labs     02/04/23 0035 02/05/23 0241   * 136   K 3.2* 3.0*    107   CO2 18* 19*   BUN 8 7   CREATININE 0.60 0.46*   GLUCOSE 116* 113*   CALCIUM 9.1 8.7   PROT 8.0  --    LABALBU 4.2  --    BILITOT 0.9  --    ALKPHOS 68  --    AST 23  --    ALT 13  --       Magnesium:   Lab Results   Component Value Date/Time    MG 1.9 02/05/2023 02:41 AM    MG 1.6 02/04/2023 12:35 AM    MG 2.0 08/09/2019 02:40 AM     Phosphorus: No results found for: PHOS  Ionized Calcium: No results found for: CAION     Urinalysis:   Lab Results   Component Value Date/Time    NITRU NEGATIVE 02/04/2023 03:00 AM    COLORU Yellow 02/04/2023 03:00 AM    PHUR 5.5 02/04/2023 03:00 AM    WBCUA None 02/04/2023 03:00 AM    RBCUA None 02/04/2023 03:00 AM    MUCUS NOT REPORTED 09/03/2020 04:54 PM    TRICHOMONAS NOT REPORTED 09/03/2020 04:54 PM    YEAST NOT REPORTED 09/03/2020 04:54 PM    BACTERIA NOT REPORTED 09/03/2020 04:54 PM    SPECGRAV 1.012 02/04/2023 03:00 AM    LEUKOCYTESUR NEGATIVE 02/04/2023 03:00 AM    UROBILINOGEN Normal 02/04/2023 03:00 AM    BILIRUBINUR NEGATIVE 02/04/2023 03:00 AM    GLUCOSEU NEGATIVE 02/04/2023 03:00 AM    KETUA NEGATIVE 02/04/2023 03:00 AM    AMORPHOUS NOT REPORTED 09/03/2020 04:54 PM       HgBA1c:  No results found for: LABA1C  TSH:    Lab Results   Component Value Date/Time    TSH 0.52 02/04/2023 01:24 AM     Lactic Acid:   Lab Results   Component Value Date/Time    LACTA NOT REPORTED 08/08/2019 11:55 PM    LACTA NOT REPORTED 08/08/2019 09:05 PM      Troponin: No results for input(s): TROPONINI in the last 72 hours. Microbiology:      Other Labs:        Radiology/Imaging:      ASSESSMENT:     Patient Active Problem List    Diagnosis Date Noted    Sepsis due to group A Streptococcus ([facility] [address] 08/08/2019    Polysubstance abuse ([facility] 08/08/2019    Crack cocaine use 08/08/2019    Symptomatic anemia 08/08/2019    Iron deficiency anemia 08/08/2019    Hypokalemia 08/08/2019    Crack cocaine overdose, accidental or unintentional, initial encounter ([facility] 02/04/2023    Streptococcal pharyngitis 08/07/2019    Abscess, hand 07/05/2015    Cellulitis of hand 07/05/2015    Abscess of left hand 03/19/2014    Shoulder pain           PLAN:       Neurologic:   - Crack cocaine intoxication  - Neuro checks per protocol  -UDS was positive for cocaine  -We will avoid beta-blockers and will use Ativan as needed for agitation  -This morning she is alert and oriented x4, improving     Cardiovascular:  - Hypertension                 -Blood pressure has been stable in the ED  -Tachycardia and tachypnea improving  -Initial troponin 21, repeat 34, repeat 27 and 27 most likely type II NSTEMI due to demand ischemia from the cocaine use                      Pulmonary:  - Maintain oxygen sats >92%  - Pulmonary toilet  -Chest x-ray showed new dense pneumonia in the lateral portion of the right lung base.   -CT PE showed no evidence of pulmonary embolus in the central pulmonary arteries and in the central branches, however it showed a dense pneumonic consolidation in the anterior segment and lateral segment of the lower lobe of the right lung  -Currently on 1L nasal cannula saturating at 100%     GI/Nutrition:  - Bowel regimen:  - Diet:No diet orders on file     Renal/Fluid/Electrolyte:  - IV Fluids:   - I/O: In: 50   Out: -   - Hypokalemia 3.2, repleted  -Mild hyponatremia, 133, monitor     ID:  -WBC trending up from 23-30 this morning   -Strep pneumo, Legionella, blood and urine cultures negative  Resp panel negative  -MRSA nasal probe was positive  -Continue vancomycin and Zosyn  WBC trending up  Pro-Raul elevated 1.7  - Was given vancomycin and zozsyn in the ed  MRSA swab POSITIVE, resumed vancomycin  -Blood cultures collected, are negative     Hematology:  - status post 3 units PRBC, hemoglobin this morning 7.1  -Patient reports vaginal bleeding but no bleeding on exam  -iron studies showed iron deficient anemia- Hemoglobin post         Endocrine:   - Glucose controlled - most recent BGL is   -Patient has no history of diabetes          Prophylaxis:  - DVT prophylaxis: EPC's only due to anemia  - GI prophylaxis: N/A     Lines/drains:  -External urinary catheter, 2 peripheral IVs        Madeleine Lopez MD  Family Medicine PGY2  2/5/2023 8:51 AM   Attending Physician Statement  I have discussed the care of Olamide Diaz, including pertinent history and exam findings,  with the resident. I have seen and examined the patient and the key elements of all parts of the encounter have been performed by me. I agree with the assessment, plan and orders as documented by the resident with additions . Sepsis due to right lower lobe pneumonia   Will continue zosyn and vancomycin - mrsa nasal swab +   Donot de-escalate antibiotics yet   Follow cultures   Xray chest in am to access for pleural effusion   Hb stable   DVT prophylaxis     Transfer to floor   Will follow for pulmonary         Total critical care time caring for this patient with life threatening, unstable organ failure, including direct patient contact, management of life support systems, review of data including imaging and labs, discussions with other team members and physicians at least 27   Min so far today, excluding procedures.         Electronically signed by Osman Soriano MD on   2/5/23 at 8:59 PM EST    Please note that this chart was generated using voice recognition Dragon dictation software. Although every effort was made to ensure the accuracy of this automated transcription, some errors in transcription may have occurred.

## 2023-02-05 NOTE — PROGRESS NOTES
[facility]  PRO[facility]ESS NOTE    Shift date: 2/04/2023  Shift day: Saturday   Shift # 3    Room # 9543/1779-88   Name: Tono Solis                Lutheran: [facility]: Unknown    Referral: Routine Visit    Admit Date & Time: 2/3/2023 11:49 PM    Assessment:  Tono Solis is a 36 y.o. female in the hospital because of \"Shortness of Breath and Cough. \" Upon entering the room writer observes patient resting in hospital bed, with nurse present in room. Intervention:  Writer introduced self and title as . Patient had difficulty speaking and nurse offered water, per her request. Patient requested prayer during  visit.  provided comfort, support and hospitality to patient in room.  offered prayer for patient's well-being and care team.     Outcome:  Patient thanked  for visit and care. Plan:  Chaplains will remain available to offer spiritual and emotional support as needed. 02/04/23 2124   Encounter Summary   Service Provided For: Patient   Referral/Consult From: [facility] Parent   Last Encounter  02/04/23   Complexity of Encounter Low   Begin Time 2124   End Time  2139   Total Time Calculated 15 min   Encounter    Type Initial Screen/Assessment   Spiritual/Emotional needs   Type Spiritual Support   Assessment/Intervention/Outcome   Assessment Coping;Powerlessness   Intervention Active listening;Discussed belief system/Mu-ism practices/jay;Discussed illness injury and its impact; Explored/Affirmed feelings, thoughts, concerns;Explored Coping Skills/Resources;Nurtured Hope;Prayer (assurance of)/[place];Sustaining Presence/Ministry of presence   Outcome Comfort;Coping;Receptive   Plan and Referrals   Plan/Referrals Continue to visit, (comment)  (as needed)     Electronically signed by Andrew Angulo on 2/4/2023 at 10:36 PM.  [address] [place] [phone]

## 2023-02-05 NOTE — ACP (ADVANCE CARE PLANNING)
Advance Care Planning     Advance Care Planning Inpatient Note  [facility] Department    Today's Date: 2/5/2023  Unit: [facility] CAR 3- MICU    Received request from [employer]. Upon review of chart and communication with care team, patient's decision making abilities are not in question. . Patient and significant other  was/were present in the room during visit. Goals of ACP Conversation:  Discuss advance care planning documents  Facilitate a discussion related to patient's goals of care as they align with the patient's values and beliefs. Health Care Decision Makers:       Primary Decision Maker: Paola Limon - Child - [phone]    Secondary Decision Maker: Jamie Marla - Boyfriend - [phone]  Summary:  Completed [address] Decision [facility]    Advance Care Planning Documents (Patient Wishes):  Healthcare Power of /Advance Directive Appointment of Health Care Agent  Living Will/Advance Directive     Assessment:  Patients decision making abilities were not in question. The patient was clear on who she wanted to speak for her. This  had no doubts about patient's abilities and desires. Interventions:  Provided education on documents for clarity and greater understanding  Discussed and provided education on state decision maker hierarchy  Assisted in the completion of documents according to patient's wishes at this time    Care Preferences Communicated:   No    Outcomes/Plan:  New advance directive completed. Returned original document(s) to patient, as well as copies for distribution to appointed agents  Copy of advance directive given to staff to scan into medical record.     Electronically signed by Siomara Feliciano, Chaplain Resident on 2/5/2023 at 3:23 PM

## 2023-02-05 NOTE — CARE COORDINATION
Case Management Assessment  Initial Evaluation    Date/Time of Evaluation: 2/5/2023 5:01 PM  Assessment Completed by: Kaci Fabian RN    If patient is discharged prior to next notation, then this note serves as note for discharge by case management. Patient Name: Juanita Kelley                   YOB: 1980  Diagnosis: Hypokalemia [E87.6]  Tachycardia [R00.0]  Crack cocaine overdose, accidental or unintentional, initial encounter ([facility] [address] Olya Victor  Pneumonia of right lower lobe due to infectious organism [J18.9]  Anemia, unspecified type [D64.9]  Fever in other diseases [R50.81]                   Date / Time: 2/3/2023 11:49 PM    Patient Admission Status: Inpatient   Readmission Risk (Low < 19, Mod (19-27), High > 27): Readmission Risk Score: 13.2    Current PCP: Fernie David MD  PCP verified by CM? (P) Yes    Chart Reviewed: Yes      History Provided by: (P) Patient  Patient Orientation: (P) Alert and Oriented    Patient Cognition: (P) Alert    Hospitalization in the last 30 days (Readmission):  No    If yes, Readmission Assessment in CM Navigator will be completed.     Advance Directives:      Code Status: Full Code   Patient's Primary Decision Maker is:      Primary Decision Maker: Gayle Gonzales Child - [phone]    Secondary Decision Maker: Micky Gonzales Joseienazra - [phone]    Discharge Planning:    Patient lives with: (P) Other (Comment) (room mates) Type of Home: (P) House  Primary Care Giver: (P) Self  Patient Support Systems include: (P) Spouse/Significant Other   Current Financial resources: (P) Medicaid  Current community resources:    Current services prior to admission: (P) None            Current DME:              Type of Home Care services:  (P) None    ADLS  Prior functional level: (P) Independent in ADLs/IADLs  Current functional level: (P) Independent in ADLs/IADLs    PT AM-PAC:   /24  OT AM-PAC:   /24    Family can provide assistance at DC: (P) No  Would you like Case Management to discuss the discharge plan with any other family members/significant others, and if so, who? Plans to Return to Present Housing: (P) Yes  Other Identified Issues/Barriers to RETURNING to current housing: none  Potential Assistance needed at discharge: (P) [address]            Potential DME: (P) Home Aerosol  Patient expects to discharge to: (P) [facility] for transportation at discharge: (P) Family    Financial    Payor: Jed Fraser / Plan: Lotus Lopez / Product Type: *No Product type* /     Does insurance require precert for SNF: Yes    Potential assistance Purchasing Medications:    Meds-to-Beds request:        [id number] Rafael Kelly, [address] [place] [address] Loyda Ordonez [phone]  [address] [place] [id number]  Phone: [phone] Fax: [phone]      Notes:    Factors facilitating achievement of predicted outcomes: Friend support and Cooperative    Barriers to discharge: Medical complications    Additional Case Management Notes: patient returning home with room mates.  She denies needs and has transportation home    The Plan for Transition of Care is related to the following treatment goals of Hypokalemia [E87.6]  Tachycardia [R00.0]  Crack cocaine overdose, accidental or unintentional, initial encounter ([facility] [address] [facility] [T40.5X1A]  Pneumonia of right lower lobe due to infectious organism [J18.9]  Anemia, unspecified type [D64.9]  Fever in other diseases [R50.81]    The Patient and/or Patient Representative Agree with the Discharge Plan?  yes    Margaree Litten, RN  Case Management Department  Ph: [phone] Fax: [phone]

## 2023-02-05 NOTE — PLAN OF CARE
Problem: Discharge Planning  Goal: Discharge to home or other facility with appropriate resources  2/4/2023 2205 by Amber Rehman RN  Outcome: Progressing  2/4/2023 1037 by Ani Landon RN  Outcome: Progressing     Problem: Pain  Goal: Verbalizes/displays adequate comfort level or baseline comfort level  2/4/2023 2205 by Amber Rehman RN  Outcome: Progressing  2/4/2023 1037 by Ani Landon RN  Outcome: Progressing     Problem: Skin/Tissue Integrity  Goal: Absence of new skin breakdown  Description: 1. Monitor for areas of redness and/or skin breakdown  2. Assess vascular access sites hourly  3. Every 4-6 hours minimum:  Change oxygen saturation probe site  4. Every 4-6 hours:  If on nasal continuous positive airway pressure, respiratory therapy assess nares and determine need for appliance change or resting period. 2/4/2023 2205 by Amber Rehman RN  Outcome: Progressing  2/4/2023 1037 by Ani Landon RN  Outcome: Progressing     Problem: Safety - Adult  Goal: Free from fall injury  2/4/2023 2205 by Amber Rehman RN  Outcome: Progressing  2/4/2023 1037 by Ani aLndon RN  Outcome: Progressing     Problem: Confusion  Goal: Confusion, delirium, dementia, or psychosis is improved or at baseline  Description: INTERVENTIONS:  1. Assess for possible contributors to thought disturbance, including medications, impaired vision or hearing, underlying metabolic abnormalities, dehydration, psychiatric diagnoses, and notify attending LIP  2. [place] high risk fall precautions, as indicated  3. Provide frequent short contacts to provide reality reorientation, refocusing and direction  4. Decrease environmental stimuli, including noise as appropriate  5. Monitor and intervene to maintain adequate nutrition, hydration, elimination, sleep and activity  6. If unable to ensure safety without constant attention obtain sitter and review sitter guidelines with assigned personnel  7.  Initiate Psychosocial CNS and Spiritual Care consult, as indicated  2/4/2023 2205 by Ees Mejia, RN  Outcome: Progressing  2/4/2023 1037 by Karri Tim RN  Outcome: Progressing Hydroxychloroquine Counseling:  I discussed with the patient that a baseline ophthalmologic exam is needed at the start of therapy and every year thereafter while on therapy. A CBC may also be warranted for monitoring.  The side effects of this medication were discussed with the patient, including but not limited to agranulocytosis, aplastic anemia, seizures, rashes, retinopathy, and liver toxicity. Patient instructed to call the office should any adverse effect occur.  The patient verbalized understanding of the proper use and possible adverse effects of Plaquenil.  All the patient's questions and concerns were addressed.

## 2023-02-05 NOTE — PROGRESS NOTES
Critical care team - Resident sign-out to medicine service      Date and time: 2/5/2023 2:58 PM  Patient's name:  Jay Jay Schaefer Record Number: [id number]  Patient's account/billing number: [de-identified]  Patient's YOB: 1980  Age: 36 y.o. Date of Admission: 2/3/2023 11:49 PM  Length of stay during current admission: 1    Primary Care Physician: Colleen Jacobo MD    Code Status: Full Code    Mode of physician to physician communication:        [x] Via telephone   [] In person     Date and time of sign-out: 2/5/2023 2:58 PM    Accepting Internal Medicine resident: Sheela    Accepting Medicine team: IM Team 3    Accepting team's attending:  Dr. Fco Mohamud    Patient's current ICU Bed:  3901     Patient's assigned bed on floor:  235        [x] Med-Surg Monitored [] Step-down       [] Psychiatry ICU       [] Psych floor     Reason for ICU admission:     Timur Siddiqi is a 36 y.o. female with history of asthma, hypertension presented to the ED with shortness of breath, cough, chest pain secondary to cocaine use. Patient was having a persistent cough, difficulty breathing, chest pain. In the ED she was tachycardic, tachypneic, febrile. CBC in the ED showed hemoglobin of 4.5, patient endorses a history of menorrhagia, however pelvic exam in the ED showed minimal blood. Patient states she is currently on her period. Patient also had a leukocytosis of 23.2, potassium was 3.2, troponins were mildly elevated initial one was 21, repeat 24. D-dimer was elevated at 1.0, CT PE showed no pulmonary embolus in the central pulmonary arteries, but showed dense pneumonic consolidation in the anterior segment in the lateral segment of the right lower lobe of the lung. Chest x-ray showed new dense pneumonia. Patient was given vancomycin and Zosyn in the ED. Was also given 2 units of PRBCs due to her low hemoglobin of 4.5.   Ativan 1 mg and Versed was also given in the ED due to her cocaine intoxication, and she was given potassium replacement. She was also given a 2 L bolus of normal saline. ICU course summary:     In the ICU patient was placed on 2 mg Ativan every 4 hours as needed for her agitation secondary to cocaine use, and she was also placed on fentanyl for her pain. Patient's MRSA came back positive, vancomycin and Zosyn were continued for her pneumonia. Blood cultures, urine cultures, Legionella, strep pneumo were all negative. Rest panel was also negative. Hemoglobin after the 2 units of blood was 6.5, another unit of PRBCs was ordered, hemoglobin the morning after was 7.1. DVT prophylaxis currently being held due to patient's anemia. This morning patient was much more alert and oriented x4, patient is currently satting well on room air. Sputum cultures are still pending. Procedures during patient's ICU stay:     none    Current Vitals:     BP (!) 149/70   Pulse 97   Temp 98.6 °F (37 °C) (Oral)   Resp 21   Ht 5' 2\" (1.575 m)   Wt 189 lb 2.5 oz (85.8 kg)   LMP 01/27/2023 (Approximate)   SpO2 100%   BMI 34.60 kg/m²       Cultures:     Blood cultures:                 [] None drawn      [] Negative             []  Positive (Details:  )  Urine Culture:                   [] None drawn      [] Negative             []  Positive (Details:  )  Sputum Culture:               [] None drawn       [] Negative             []  Positive (Details:  )   Endotracheal aspirate:     [] None drawn       [] Negative             []  Positive (Details:  )       Consults:     1.      Assessment:     Patient Active Problem List    Diagnosis Date Noted    Sepsis due to group A Streptococcus ([facility] 08/08/2019    Polysubstance abuse ([facility] 08/08/2019    Crack cocaine use 08/08/2019    Symptomatic anemia 08/08/2019    Iron deficiency anemia 08/08/2019    Hypokalemia 08/08/2019    Crack cocaine overdose, accidental or unintentional, initial encounter ([facility] 02/04/2023    Streptococcal pharyngitis 08/07/2019    Abscess, hand 07/05/2015    Cellulitis of hand 07/05/2015    Abscess of left hand 03/19/2014    Shoulder pain        Additional assessment:        Recommended Follow-up:       Neurologic:   - Crack cocaine intoxication  - Neuro checks per protocol  -UDS was positive for cocaine  -We will avoid beta-blockers and will use Ativan as needed for agitation  -This morning she is alert and oriented x4, improving     Cardiovascular:  - Hypertension                 -Blood pressure has been stable in the ED  -Tachycardia and tachypnea improving  -Initial troponin 21, repeat 34, repeat 27 and 27 most likely type II NSTEMI due to demand ischemia from the cocaine use                      Pulmonary:  - Maintain oxygen sats >92%  - Pulmonary toilet  -Chest x-ray showed new dense pneumonia in the lateral portion of the right lung base.   -CT PE showed no evidence of pulmonary embolus in the central pulmonary arteries and in the central branches, however it showed a dense pneumonic consolidation in the anterior segment and lateral segment of the lower lobe of the right lung  -Currently on 1L nasal cannula saturating at 100%     GI/Nutrition:  - Bowel regimen:  - Diet:No diet orders on file     Renal/Fluid/Electrolyte:  - IV Fluids:   - I/O: In: 50   Out: -   - Hypokalemia 3.2, repleted  -Mild hyponatremia, 133, monitor     ID:  -WBC trending up from 23-30 this morning   -Strep pneumo, Legionella, blood and urine cultures negative  Resp panel negative  -MRSA nasal probe was positive  -Continue vancomycin and Zosyn  WBC trending up  Pro-Raul elevated 1.7  - Was given vancomycin and zozsyn in the ed  MRSA swab POSITIVE, resumed vancomycin  -Blood cultures collected, are negative  -Sputum culture still pending     Hematology:  - status post 3 units PRBC, hemoglobin this morning 7.1  -Patient reports vaginal bleeding but no bleeding on exam  -iron studies showed iron deficient anemia-   -Continue to trend  - Might need outpatient OB workup for menorrhagia     Endocrine:   - Glucose controlled - most recent BGL is   -Patient has no history of diabetes        Prophylaxis:  - DVT prophylaxis: EPC's only due to anemia  - GI prophylaxis: N/A     Lines/drains:  -External urinary catheter, 2 peripheral IVs       Above mentioned assessment and plan was discussed by me with the admitting medicine resident. The medicine team assigned to the patient by medicine admitting resident will be following up the patient from now onwards on the floor.      Taqueria Bustos MD,   Critical care resident  Department of Internal Medicine/ Critical care  [facility] ([place]             2/5/2023, 2:58 PM

## 2023-02-05 NOTE — PLAN OF CARE
Problem: Discharge Planning  Goal: Discharge to home or other facility with appropriate resources  2/5/2023 0746 by Quiana Cerrato RN  Outcome: Progressing     Problem: Pain  Goal: Verbalizes/displays adequate comfort level or baseline comfort level  2/5/2023 0746 by Quiana Cerrato RN  Outcome: Progressing     Problem: Skin/Tissue Integrity  Goal: Absence of new skin breakdown  Description: 1. Monitor for areas of redness and/or skin breakdown  2. Assess vascular access sites hourly  3. Every 4-6 hours minimum:  Change oxygen saturation probe site  4. Every 4-6 hours:  If on nasal continuous positive airway pressure, respiratory therapy assess nares and determine need for appliance change or resting period. 2/5/2023 0746 by Quiana Cerrato RN  Outcome: Progressing     Problem: Safety - Adult  Goal: Free from fall injury  2/5/2023 0746 by Quiana Cerrato RN  Outcome: Progressing     Problem: Confusion  Goal: Confusion, delirium, dementia, or psychosis is improved or at baseline  Description: INTERVENTIONS:  1. Assess for possible contributors to thought disturbance, including medications, impaired vision or hearing, underlying metabolic abnormalities, dehydration, psychiatric diagnoses, and notify attending LIP  2. [place] high risk fall precautions, as indicated  3. Provide frequent short contacts to provide reality reorientation, refocusing and direction  4. Decrease environmental stimuli, including noise as appropriate  5. Monitor and intervene to maintain adequate nutrition, hydration, elimination, sleep and activity  6. If unable to ensure safety without constant attention obtain sitter and review sitter guidelines with assigned personnel  7.  Initiate Psychosocial CNS and Spiritual Care consult, as indicated  2/5/2023 0746 by Quiana Cerrato RN  Outcome: Progressing

## 2023-02-06 ENCOUNTER — APPOINTMENT (OUTPATIENT)
Dept: GENERAL RADIOLOGY | Age: 43
DRG: 720 | End: 2023-02-06
Payer: MEDICAID

## 2023-02-06 PROBLEM — R00.0 TACHYCARDIA: Status: ACTIVE | Noted: 2023-02-06

## 2023-02-06 LAB
ABSOLUTE EOS #: 0.32 K/UL (ref 0–0.4)
ABSOLUTE IMMATURE GRANULOCYTE: 0.16 K/UL (ref 0–0.3)
ABSOLUTE LYMPH #: 1.76 K/UL (ref 1–4.8)
ABSOLUTE MONO #: 1.12 K/UL (ref 0.1–0.8)
ABSOLUTE RETIC #: 0.13 M/UL (ref 0.02–0.1)
ANION GAP SERPL CALCULATED.3IONS-SCNC: 9 MMOL/L (ref 9–17)
BASOPHILS # BLD: 0 % (ref 0–2)
BASOPHILS ABSOLUTE: 0 K/UL (ref 0–0.2)
BUN SERPL-MCNC: 7 MG/DL (ref 6–20)
C TRACH DNA SPEC QL PROBE+SIG AMP: NEGATIVE
CALCIUM SERPL-MCNC: 8.6 MG/DL (ref 8.6–10.4)
CHLORIDE SERPL-SCNC: 111 MMOL/L (ref 98–107)
CO2 SERPL-SCNC: 18 MMOL/L (ref 20–31)
CREAT SERPL-MCNC: 0.49 MG/DL (ref 0.5–0.9)
CRP SERPL HS-MCNC: 91 MG/L (ref 0–5)
EKG ATRIAL RATE: 107 BPM
EKG P AXIS: 59 DEGREES
EKG P-R INTERVAL: 162 MS
EKG Q-T INTERVAL: 364 MS
EKG QRS DURATION: 102 MS
EKG QTC CALCULATION (BAZETT): 485 MS
EKG R AXIS: 71 DEGREES
EKG T AXIS: 48 DEGREES
EKG VENTRICULAR RATE: 107 BPM
EOSINOPHILS RELATIVE PERCENT: 2 % (ref 1–4)
GFR SERPL CREATININE-BSD FRML MDRD: >60 ML/MIN/1.73M2
GLUCOSE SERPL-MCNC: 119 MG/DL (ref 70–99)
HAPTOGLOB SERPL-MCNC: 173 MG/DL (ref 30–200)
HCT VFR BLD AUTO: 26.6 % (ref 36.3–47.1)
HCT VFR BLD AUTO: 27.6 % (ref 36.3–47.1)
HCT VFR BLD AUTO: 29.5 % (ref 36.3–47.1)
HGB BLD-MCNC: 6.9 G/DL (ref 11.9–15.1)
HGB BLD-MCNC: 7.2 G/DL (ref 11.9–15.1)
HGB BLD-MCNC: 8 G/DL (ref 11.9–15.1)
IMMATURE GRANULOCYTES: 1 %
LYMPHOCYTES # BLD: 11 % (ref 24–44)
M PNEUMO IGM SER QL IA: 0.66
MAGNESIUM SERPL-MCNC: 2 MG/DL (ref 1.6–2.6)
MCH RBC QN AUTO: 17.3 PG (ref 25.2–33.5)
MCHC RBC AUTO-ENTMCNC: 25 G/DL (ref 28.4–34.8)
MCV RBC AUTO: 69 FL (ref 82.6–102.9)
MONOCYTES # BLD: 7 % (ref 1–7)
MORPHOLOGY: ABNORMAL
N GONORRHOEA DNA SPEC QL PROBE+SIG AMP: NEGATIVE
NRBC AUTOMATED: 0.2 PER 100 WBC
PDW BLD-RTO: 31.8 % (ref 11.8–14.4)
PLATELET # BLD AUTO: ABNORMAL K/UL (ref 138–453)
PLATELET, FLUORESCENCE: 352 K/UL (ref 138–453)
PLATELET, IMMATURE FRACTION: 4.3 % (ref 1.1–10.3)
POTASSIUM SERPL-SCNC: 3.7 MMOL/L (ref 3.7–5.3)
RBC # BLD: 4 M/UL (ref 3.95–5.11)
RETICS/RBC NFR AUTO: 3.2 % (ref 0.5–2)
SEG NEUTROPHILS: 79 % (ref 36–66)
SEGMENTED NEUTROPHILS ABSOLUTE COUNT: 12.64 K/UL (ref 1.8–7.7)
SODIUM SERPL-SCNC: 138 MMOL/L (ref 135–144)
SPECIMEN DESCRIPTION: NORMAL
WBC # BLD AUTO: 16 K/UL (ref 3.5–11.3)

## 2023-02-06 PROCEDURE — 85014 HEMATOCRIT: CPT

## 2023-02-06 PROCEDURE — 71045 X-RAY EXAM CHEST 1 VIEW: CPT

## 2023-02-06 PROCEDURE — 94640 AIRWAY INHALATION TREATMENT: CPT

## 2023-02-06 PROCEDURE — 6360000002 HC RX W HCPCS

## 2023-02-06 PROCEDURE — 83010 ASSAY OF HAPTOGLOBIN QUANT: CPT

## 2023-02-06 PROCEDURE — 6370000000 HC RX 637 (ALT 250 FOR IP): Performed by: INTERNAL MEDICINE

## 2023-02-06 PROCEDURE — 6370000000 HC RX 637 (ALT 250 FOR IP)

## 2023-02-06 PROCEDURE — 6370000000 HC RX 637 (ALT 250 FOR IP): Performed by: STUDENT IN AN ORGANIZED HEALTH CARE EDUCATION/TRAINING PROGRAM

## 2023-02-06 PROCEDURE — 86900 BLOOD TYPING SEROLOGIC ABO: CPT

## 2023-02-06 PROCEDURE — 6360000002 HC RX W HCPCS: Performed by: STUDENT IN AN ORGANIZED HEALTH CARE EDUCATION/TRAINING PROGRAM

## 2023-02-06 PROCEDURE — 36430 TRANSFUSION BLD/BLD COMPNT: CPT

## 2023-02-06 PROCEDURE — 2580000003 HC RX 258

## 2023-02-06 PROCEDURE — 99233 SBSQ HOSP IP/OBS HIGH 50: CPT | Performed by: INTERNAL MEDICINE

## 2023-02-06 PROCEDURE — 2580000003 HC RX 258: Performed by: INTERNAL MEDICINE

## 2023-02-06 PROCEDURE — 36415 COLL VENOUS BLD VENIPUNCTURE: CPT

## 2023-02-06 PROCEDURE — 85018 HEMOGLOBIN: CPT

## 2023-02-06 PROCEDURE — 80048 BASIC METABOLIC PNL TOTAL CA: CPT

## 2023-02-06 PROCEDURE — P9016 RBC LEUKOCYTES REDUCED: HCPCS

## 2023-02-06 PROCEDURE — 85045 AUTOMATED RETICULOCYTE COUNT: CPT

## 2023-02-06 PROCEDURE — 85055 RETICULATED PLATELET ASSAY: CPT

## 2023-02-06 PROCEDURE — 86140 C-REACTIVE PROTEIN: CPT

## 2023-02-06 PROCEDURE — 85025 COMPLETE CBC W/AUTO DIFF WBC: CPT

## 2023-02-06 PROCEDURE — 6360000002 HC RX W HCPCS: Performed by: INTERNAL MEDICINE

## 2023-02-06 PROCEDURE — 83735 ASSAY OF MAGNESIUM: CPT

## 2023-02-06 PROCEDURE — 2580000003 HC RX 258: Performed by: STUDENT IN AN ORGANIZED HEALTH CARE EDUCATION/TRAINING PROGRAM

## 2023-02-06 PROCEDURE — 1200000000 HC SEMI PRIVATE

## 2023-02-06 PROCEDURE — 94760 N-INVAS EAR/PLS OXIMETRY 1: CPT

## 2023-02-06 RX ORDER — GUAIFENESIN DEXTROMETHORPHAN HYDROBROMIDE ORAL SOLUTION 10; 100 MG/5ML; MG/5ML
10 SOLUTION ORAL EVERY 4 HOURS PRN
Status: DISCONTINUED | OUTPATIENT
Start: 2023-02-06 | End: 2023-02-06 | Stop reason: SDUPTHER

## 2023-02-06 RX ORDER — HYDRALAZINE HYDROCHLORIDE 20 MG/ML
10 INJECTION INTRAMUSCULAR; INTRAVENOUS EVERY 6 HOURS PRN
Status: DISCONTINUED | OUTPATIENT
Start: 2023-02-06 | End: 2023-02-07 | Stop reason: HOSPADM

## 2023-02-06 RX ORDER — GUAIFENESIN/DEXTROMETHORPHAN 100-10MG/5
10 SYRUP ORAL EVERY 4 HOURS PRN
Status: DISCONTINUED | OUTPATIENT
Start: 2023-02-06 | End: 2023-02-07 | Stop reason: HOSPADM

## 2023-02-06 RX ORDER — LISINOPRIL 5 MG/1
5 TABLET ORAL DAILY
Status: DISCONTINUED | OUTPATIENT
Start: 2023-02-06 | End: 2023-02-07

## 2023-02-06 RX ORDER — LORAZEPAM 1 MG/1
1 TABLET ORAL 2 TIMES DAILY
Status: DISCONTINUED | OUTPATIENT
Start: 2023-02-06 | End: 2023-02-07 | Stop reason: HOSPADM

## 2023-02-06 RX ORDER — SODIUM CHLORIDE 9 MG/ML
INJECTION, SOLUTION INTRAVENOUS PRN
Status: DISCONTINUED | OUTPATIENT
Start: 2023-02-06 | End: 2023-02-07 | Stop reason: HOSPADM

## 2023-02-06 RX ADMIN — LORAZEPAM 1 MG: 1 TABLET ORAL at 11:53

## 2023-02-06 RX ADMIN — IRON SUCROSE 200 MG: 20 INJECTION, SOLUTION INTRAVENOUS at 11:55

## 2023-02-06 RX ADMIN — LORAZEPAM 2 MG: 2 INJECTION INTRAMUSCULAR; INTRAVENOUS at 17:45

## 2023-02-06 RX ADMIN — ACETAMINOPHEN 650 MG: 325 TABLET ORAL at 22:08

## 2023-02-06 RX ADMIN — PIPERACILLIN AND TAZOBACTAM 3375 MG: 3; .375 INJECTION, POWDER, FOR SOLUTION INTRAVENOUS at 13:46

## 2023-02-06 RX ADMIN — GUAIFENESIN AND DEXTROMETHORPHAN 10 ML: 100; 10 SYRUP ORAL at 21:33

## 2023-02-06 RX ADMIN — GUAIFENESIN AND DEXTROMETHORPHAN 10 ML: 100; 10 SYRUP ORAL at 15:51

## 2023-02-06 RX ADMIN — LORAZEPAM 1 MG: 1 TABLET ORAL at 22:08

## 2023-02-06 RX ADMIN — ACETAMINOPHEN 650 MG: 325 TABLET ORAL at 03:23

## 2023-02-06 RX ADMIN — IPRATROPIUM BROMIDE AND ALBUTEROL SULFATE 1 AMPULE: 2.5; .5 SOLUTION RESPIRATORY (INHALATION) at 19:49

## 2023-02-06 RX ADMIN — Medication 1500 MG: at 04:24

## 2023-02-06 RX ADMIN — PIPERACILLIN AND TAZOBACTAM 3375 MG: 3; .375 INJECTION, POWDER, FOR SOLUTION INTRAVENOUS at 03:15

## 2023-02-06 RX ADMIN — IPRATROPIUM BROMIDE AND ALBUTEROL SULFATE 1 AMPULE: 2.5; .5 SOLUTION RESPIRATORY (INHALATION) at 11:59

## 2023-02-06 RX ADMIN — HYDRALAZINE HYDROCHLORIDE 10 MG: 20 INJECTION INTRAMUSCULAR; INTRAVENOUS at 23:50

## 2023-02-06 RX ADMIN — LORAZEPAM 2 MG: 2 INJECTION INTRAMUSCULAR; INTRAVENOUS at 07:48

## 2023-02-06 RX ADMIN — Medication 1500 MG: at 18:03

## 2023-02-06 RX ADMIN — LISINOPRIL 5 MG: 5 TABLET ORAL at 17:35

## 2023-02-06 RX ADMIN — PIPERACILLIN AND TAZOBACTAM 3375 MG: 3; .375 INJECTION, POWDER, FOR SOLUTION INTRAVENOUS at 21:33

## 2023-02-06 RX ADMIN — ALCOHOL 1 TABLET: 70.47 GEL TOPICAL at 09:46

## 2023-02-06 ASSESSMENT — PAIN DESCRIPTION - LOCATION: LOCATION: HEAD

## 2023-02-06 ASSESSMENT — PAIN DESCRIPTION - DESCRIPTORS: DESCRIPTORS: ACHING

## 2023-02-06 ASSESSMENT — PAIN SCALES - GENERAL
PAINLEVEL_OUTOF10: 8
PAINLEVEL_OUTOF10: 7
PAINLEVEL_OUTOF10: 8
PAINLEVEL_OUTOF10: 4

## 2023-02-06 NOTE — PLAN OF CARE
Problem: Discharge Planning  Goal: Discharge to home or other facility with appropriate resources  2/6/2023 1743 by Rona Farr RN  Outcome: Progressing  2/6/2023 0348 by Damon Rosas  Outcome: Progressing     Problem: Pain  Goal: Verbalizes/displays adequate comfort level or baseline comfort level  2/6/2023 1743 by Rona Farr RN  Outcome: Progressing  2/6/2023 0348 by Damon Rosas  Outcome: Progressing     Problem: Skin/Tissue Integrity  Goal: Absence of new skin breakdown  Description: 1. Monitor for areas of redness and/or skin breakdown  2. Assess vascular access sites hourly  3. Every 4-6 hours minimum:  Change oxygen saturation probe site  4. Every 4-6 hours:  If on nasal continuous positive airway pressure, respiratory therapy assess nares and determine need for appliance change or resting period. 2/6/2023 1743 by Rona Farr RN  Outcome: Progressing  2/6/2023 0348 by Damon Rosas  Outcome: Progressing     Problem: Safety - Adult  Goal: Free from fall injury  2/6/2023 1743 by Rona Farr RN  Outcome: Progressing  2/6/2023 0348 by Damon Rosas  Outcome: Progressing     Problem: Confusion  Goal: Confusion, delirium, dementia, or psychosis is improved or at baseline  Description: INTERVENTIONS:  1. Assess for possible contributors to thought disturbance, including medications, impaired vision or hearing, underlying metabolic abnormalities, dehydration, psychiatric diagnoses, and notify attending LIP  2. [place] high risk fall precautions, as indicated  3. Provide frequent short contacts to provide reality reorientation, refocusing and direction  4. Decrease environmental stimuli, including noise as appropriate  5. Monitor and intervene to maintain adequate nutrition, hydration, elimination, sleep and activity  6. If unable to ensure safety without constant attention obtain sitter and review sitter guidelines with assigned personnel  7.  Initiate Psychosocial CNS and Spiritual Care consult, as indicated  2/6/2023 1743 by Dung Hernandez RN  Outcome: Progressing  2/6/2023 0348 by Maru Collins  Outcome: Progressing     Problem: ABCDS Injury Assessment  Goal: Absence of physical injury  2/6/2023 1743 by Dung Hernandez RN  Outcome: Progressing  2/6/2023 0348 by Maru Collins  Outcome: Progressing

## 2023-02-06 NOTE — PROGRESS NOTES
[facility]  Internal Medicine Teaching Residency Program  Inpatient Daily Progress Note  ______________________________________________________________________________    Patient: Teofilo Serra  YOB: 1980   ZDY:[id number]    Acct: [de-identified]     Room: 0/200-18  Admit date: 2/3/2023  Today's date: 02/06/23  Number of days in the hospital: 2    SUBJECTIVE   Admitting Diagnosis: Crack cocaine overdose, accidental or unintentional, initial encounter ([facility]  CC: Blood loss anemia, pneumonia  Pt examined at bedside. Chart & results reviewed. -Seen and examined at bedside  - Hemodynamically stable  - Overnight patient dropped her hemoglobin to 6.9  - 1 unit of packed RBC ordered  - Follow-up with H&H  - Patient on room air, saturating 100%    ROS:  Constitutional:  negative for chills, fevers, sweats  Respiratory:  negative for cough, dyspnea on exertion, hemoptysis, shortness of breath, wheezing  Cardiovascular:  negative for chest pain, chest pressure/discomfort, lower extremity edema, palpitations  Gastrointestinal:  negative for abdominal pain, constipation, diarrhea, nausea, vomiting  Neurological:  negative for dizziness, headache  BRIEF HISTORY     The patient is a 36 y.o. female with past medical history significant for pharyngitis, Bell's palsy, menorrhagia who was initially admitted on 2/3/2023 with Hypokalemia [E87.6]  Tachycardia [R00.0]  Crack cocaine overdose, accidental or unintentional, initial encounter ([facility] [T40.5X1A]  Pneumonia of right lower lobe due to infectious organism [J18.9]  Anemia, unspecified type [D64.9]  Fever in other diseases [R50.81] and presented with EMS due to shortness of breath and cough. According to the chart review given per EMS, patient had multiple history of crack cocaine abuse when the symptoms started. On arrival, patient complains of persistent cough with shortness of breathing.   Cough with associated with productive phlegm production which was yellowish in nature and associated with pleuritic chest pain. In the ED she states she used crack cocaine. Also complained of severe menorrhagia for which she has past medical history of. Denied any abdominal pain. In the ER , ED evaluation showed hemoglobin 4.5, WBC count 23.2, potassium level 3.2, troponins are mildly elevated and uptrending from 21-24, elevated D-dimer, CT PE was negative for pulmonary embolus in the central pulmonary artery but showed dense pneumonic consolidation in the anterior segment in the lateral part of right lower lobe of the lung, chest x-ray showed new dense pneumonia. Patient received 2 unit of packed RBC. Was initially transferred to the medicine floor where patient became agitated, tachycardic, tachypneic. Patient was deciding and eventually was transferred to ICU and was put on nasal cannula oxygen. In the ICU , patient was started on Vanco and Zosyn in the ICU. Blood cultures, urine cultures, Legionella and strep pneumo antigen were all negative. After 2 unit of packed RBC transfusion hemoglobin was 7.1. Currently , patient is getting transferred out of ICU and is ready for medicine floor. Currently on room air saturating 98%. Significant event on 2023, overnight patient's hemoglobin dropped to 6.9.  1 unit of packed RBC transfusion ordered.   We will follow-up with H&H          OBJECTIVE     Vital Signs:  /80   Pulse 83   Temp 98.4 °F (36.9 °C) (Temporal)   Resp 20   Ht 5' 2\" (1.575 m)   Wt 188 lb 6 oz (85.4 kg)   LMP 2023 (Approximate)   SpO2 99%   BMI 34.45 kg/m²     Temp (24hrs), Av.5 °F (36.9 °C), Min:98.1 °F (36.7 °C), Max:99.1 °F (37.3 °C)    In: 2221.5   Out: 1510 [Urine:1510]    Physical Exam:       General appearance - alert, in no distress  Mental status - alert, oriented to person, place, and time  Eyes - pupils equal and reactive, extraocular eye movements intact  Mouth - mucous membranes moist, pharynx normal without lesions  Neck - supple, no significant adenopathy  Chest -patient has holosystolic murmur over the mitral and tricuspid area including high output high flow cardiac murmur  Heart - normal rate, regular rhythm, normal S1, S2  Abdomen - soft, nontender, nondistended  Neurological - alert, oriented, normal speech, no focal deficits   Extremities - peripheral pulses normal, no pedal edema  Skin - normal coloration and turgor, no rashes  Medications:  Scheduled Medications:    vancomycin  1,500 mg IntraVENous Q12H    multivitamin  1 tablet Oral Daily    iron sucrose  200 mg IntraVENous Daily    sodium chloride flush  5-40 mL IntraVENous 2 times per day    [Held by provider] enoxaparin  40 mg SubCUTAneous Daily    piperacillin-tazobactam  3,375 mg IntraVENous Q8H    vancomycin (VANCOCIN) intermittent dosing (placeholder)   Other RX Placeholder    ipratropium-albuterol  1 ampule Inhalation Q4H WA     Continuous Infusions:    sodium chloride      sodium chloride Stopped (02/05/23 0938)    sodium chloride 50 mL/hr at 02/05/23 1602     PRN Medicationssodium chloride, , PRN  sodium chloride flush, 5-40 mL, PRN  sodium chloride, , PRN  ondansetron, 4 mg, Q8H PRN   Or  ondansetron, 4 mg, Q6H PRN  polyethylene glycol, 17 g, Daily PRN  acetaminophen, 650 mg, Q6H PRN   Or  acetaminophen, 650 mg, Q6H PRN  LORazepam, 2 mg, Q4H PRN  fentanNYL, 50 mcg, Q4H PRN        Diagnostic Labs:  CBC:   Recent Labs     02/04/23  0035 02/04/23  0919 02/05/23  0241 02/05/23  0937 02/05/23  1353 02/06/23  0219 02/06/23  0459   WBC 23.2*  --  30.3*  --   --   --  16.0*   RBC 3.36*  --  4.00  --   --   --  4.00   HGB 4.5*   < > 7.1*   < > 7.3* 7.2* 6.9*   HCT 19.1*   < > 25.9*   < > 27.4* 26.6* 27.6*   MCV 56.8*  --  64.8*  --   --   --  69.0*   RDW 23.0*  --  30.6*  --   --   --  31.8*   PLT See Reflexed IPF Result  --  See Reflexed IPF Result  --   --   --  See Reflexed IPF Result    < > = values in this interval not displayed. BMP:   Recent Labs     02/04/23  0035 02/05/23  0241 02/05/23  0937 02/06/23  0459   * 136 135 138   K 3.2* 3.0* 4.0 3.7    107 106 111*   CO2 18* 19* 21 18*   BUN 8 7  --  7   CREATININE 0.60 0.46*  --  0.49*     BNP: No results for input(s): BNP in the last 72 hours. PT/INR:   Recent Labs     02/04/23 0035   PROTIME 11.5   INR 1.1     APTT:   Recent Labs     02/04/23 0035   APTT 19.5*     CARDIAC ENZYMES: No results for input(s): CKMB, CKMBINDEX, TROPONINI in the last 72 hours. Invalid input(s): CKTOTAL;3  FASTING LIPID PANEL:No results found for: CHOL, HDL, TRIG  LIVER PROFILE:   Recent Labs     02/04/23 0035   AST 23   ALT 13   BILITOT 0.9   ALKPHOS 68      MICROBIOLOGY:   Lab Results   Component Value Date/Time    CULTURE NORMAL RESPIRATORY SITA MODERATE GROWTH 02/04/2023 03:19 PM       Imaging:    XR CHEST PORTABLE    Result Date: 2/4/2023  Mild cardiomegaly with left ventricular prominence. Pericardial effusion may not be excluded. Minimal pulmonary vascular congestion. Mild interstitial pulmonary edema. New dense pneumonia, and/or atelectatic changes in the lateral portion right lung base. Underlying mass lesion in the right lung base is not excluded. Follow-up PA and lateral views of chest and then if clinically indicated CT scan of chest may be considered for further evaluation. CT CHEST PULMONARY EMBOLISM W CONTRAST    Result Date: 2/4/2023  Limited study for visualization of pulmonary arterial system. Significant motion induced artifacts involving the pulmonary arterial and venous structures in lower lungs bilaterally. In the central pulmonary arteries and in their central branches there is no pulmonary embolism. The distal basilar branches of pulmonary arteries of both sides are poorly visualized and cannot be evaluated for any possible pulmonary embolism. No evidence of thoracic aortic aneurysm or dissection.  No evidence of pleural effusion or pneumothorax. Evidence of moderate pulmonary emphysema particularly in the upper lungs bilaterally. Dense pneumonic consolidation with air bronchograms in the anterior segment and lateral segment of the lower lobe of right lung. No definable mass lesion in this area of the right lung. Mild atelectatic/fibrotic changes in the rest of lower lobes of both lungs. Findings suggestive of fatty infiltration in liver without definable focal lesion as in visualized upper and midportion of liver. ASSESSMENT & PLAN     ASSESSMENT / PLAN:     Principal Problem:    Crack cocaine overdose, accidental or unintentional, initial encounter ([facility]  Resolved Problems:    * No resolved hospital problems. *     1. Right Lower Lobe community-acquired pneumonia:  -Continue Vanco and Zosyn  - MRSA nasal DNA probe positive, blood cultures and urine cultures are pending and awaiting growth  - Legionella urine antigen, strep pneumo antigen negative  - Respiratory culture on direct exam showed mixed bacterial morphotypes, but the specimen showed more than 10 epithelial cells which can be contaminated  - Follow-up with the cultures     2. Sepsis:  -Likely due to pneumonia  - Leukocytosis, WBC count 30.3, tachycardia  -Gentle hydration with 0.9% NS at 50 mill per hour  - Blood culture, urine culture, respiratory cultures pending  - We will follow-up with the culture     3. Blood loss anemia:  -Initial hemoglobin at ED 4.5  - Likely etiology menorrhagia  - Monitor H&H, status post 3 unit of PRBC  - Last hemoglobin 7.2  -MCV reduced, iron panel showed low iron level with low percent saturation indicating microcytic hypochromic anemia likely iron deficiency from abnormal neurologic bleeding  - Ganzoni equation for anemia showed 2043 mg of total iron deficit, will start fenofibrate 200 mg daily  -Overnight patient dropped hemoglobin again, status post 1 unit of packed RBC transfusion        4.   Polysubstance abuse:  -UDS was positive for cocaine  - Monitoring for withdrawal  - Currently hemodynamically stable  - Continue thiamine and folate supplementation     5. COPD:  -Predominantly emphysema  - Currently not on exacerbation  - Continue DuoNeb every 4 hour  -Keep SPO2 more than 88%     6. Hypokalemia:  -Monitor BMP daily  - Replace potassium as needed      DVT ppx : Lovenox on hold due to hemoglobin      PT/OT: Consulted  Discharge Planning / SW:  consulted    Palma Chavez MD  Internal Medicine Resident, PGY-1  [facility];  [place], [place]  2/6/2023, 6:58 AM

## 2023-02-06 NOTE — PLAN OF CARE
Problem: Discharge Planning  Goal: Discharge to home or other facility with appropriate resources  Outcome: Progressing     Problem: Pain  Goal: Verbalizes/displays adequate comfort level or baseline comfort level  Outcome: Progressing     Problem: Skin/Tissue Integrity  Goal: Absence of new skin breakdown  Description: 1. Monitor for areas of redness and/or skin breakdown  2. Assess vascular access sites hourly  3. Every 4-6 hours minimum:  Change oxygen saturation probe site  4. Every 4-6 hours:  If on nasal continuous positive airway pressure, respiratory therapy assess nares and determine need for appliance change or resting period. Outcome: Progressing     Problem: Safety - Adult  Goal: Free from fall injury  Outcome: Progressing     Problem: Confusion  Goal: Confusion, delirium, dementia, or psychosis is improved or at baseline  Description: INTERVENTIONS:  1. Assess for possible contributors to thought disturbance, including medications, impaired vision or hearing, underlying metabolic abnormalities, dehydration, psychiatric diagnoses, and notify attending LIP  2. [place] high risk fall precautions, as indicated  3. Provide frequent short contacts to provide reality reorientation, refocusing and direction  4. Decrease environmental stimuli, including noise as appropriate  5. Monitor and intervene to maintain adequate nutrition, hydration, elimination, sleep and activity  6. If unable to ensure safety without constant attention obtain sitter and review sitter guidelines with assigned personnel  7.  Initiate Psychosocial CNS and Spiritual Care consult, as indicated  Outcome: Progressing     Problem: ABCDS Injury Assessment  Goal: Absence of physical injury  Outcome: Progressing

## 2023-02-06 NOTE — PROGRESS NOTES
[address] [place] [address]     Department of Internal Medicine - Staff Internal Medicine Service   ICU PATIENT TRANSFER NOTE        Patient:  Natalie Suggs  YOB: 1980  MRN: [id number]     Acct: [de-identified]     Admit date: 2/3/2023    Code Status:-  Full code     Reason for ICU Admission:-Pneumonia, low hemoglobin level of 4.5    SUPPORT DEVICES: [] Ventilator [] BIPAP  [x] Nasal Cannula [] Room Air    Consultations:- [] Cardiology [] Nephrology  [] Hemo onco  [] GI                               [] ID [] ENT  [] Rheum [] Endo   []Physiotherapy                                 Others:-     NUTRITION:  [] NPO [] Tube Feeding (Specify: ) [] TPN  [x] PO    Central Lines:- [x] No   [] Yes           If yes - Days/Date of Insertion. Pt seen,examined and Chart reviewed. ICU COURSE:    The patient is a 36 y.o. female with past medical history significant for pharyngitis, Bell's palsy, menorrhagia who was initially admitted on 2/3/2023 with Hypokalemia [E87.6]  Tachycardia [R00.0]  Crack cocaine overdose, accidental or unintentional, initial encounter ([facility] [address] [T40.5X1A]  Pneumonia of right lower lobe due to infectious organism [J18.9]  Anemia, unspecified type [D64.9]  Fever in other diseases [R50.81] and presented with EMS due to shortness of breath and cough. According to the chart review given per EMS, patient had multiple history of crack cocaine abuse when the symptoms started. On arrival, patient complains of persistent cough with shortness of breathing. Cough with associated with productive phlegm production which was yellowish in nature and associated with pleuritic chest pain. In the ED she states she used crack cocaine. Also complained of severe menorrhagia for which she has past medical history of. Denied any abdominal pain.     In the ER , ED evaluation showed hemoglobin 4.5, WBC count 23.2, potassium level 3.2, troponins are mildly elevated and uptrending from 21-24, elevated D-dimer, CT PE was negative for pulmonary embolus in the central pulmonary artery but showed dense pneumonic consolidation in the anterior segment in the lateral part of right lower lobe of the lung, chest x-ray showed new dense pneumonia. Patient received 2 unit of packed RBC. Was initially transferred to the medicine floor where patient became agitated, tachycardic, tachypneic. Patient was deciding and eventually was transferred to ICU and was put on nasal cannula oxygen. In the ICU , patient was started on Vanco and Zosyn in the ICU. Blood cultures, urine cultures, Legionella and strep pneumo antigen were all negative. After 2 unit of packed RBC transfusion hemoglobin was 7.1. Currently , patient is getting transferred out of ICU and is ready for medicine floor. Currently on room air saturating 98%. Physical Exam:   Vitals: BP (!) 171/80   Pulse 89   Temp 98.4 °F (36.9 °C) (Temporal)   Resp 20   Ht 5' 2\" (1.575 m)   Wt 189 lb 2.5 oz (85.8 kg)   LMP 01/27/2023 (Approximate)   SpO2 99%   BMI 34.60 kg/m²   24 hour intake/output:  Intake/Output Summary (Last 24 hours) at 2/5/2023 2003  Last data filed at 2/5/2023 1714  Gross per 24 hour   Intake 2221.47 ml   Output 1510 ml   Net 711.47 ml     Last 3 weights:   Wt Readings from Last 3 Encounters:   02/05/23 189 lb 2.5 oz (85.8 kg)   01/10/21 205 lb (93 kg)   09/03/20 205 lb (93 kg)       General appearance - alert, in no distress  Mental status - alert, oriented to person, place, and time  Eyes - pupils equal and reactive, extraocular eye movements intact  Mouth - mucous membranes moist, pharynx normal without lesions  Neck - supple, no significant adenopathy  Chest - clear to auscultation, no wheezes  Heart - normal rate, regular rhythm, normal S1, S2  Abdomen - soft, nontender, nondistended  Neurological - alert, oriented, normal speech, no focal deficits   Extremities - peripheral pulses normal, no pedal edema  Skin - normal coloration and turgor, no rashes      Medications:Current Inpatient  Scheduled Meds:   vancomycin  1,500 mg IntraVENous Q12H    multivitamin  1 tablet Oral Daily    sodium chloride flush  5-40 mL IntraVENous 2 times per day    [Held by provider] enoxaparin  40 mg SubCUTAneous Daily    piperacillin-tazobactam  3,375 mg IntraVENous Q8H    vancomycin (VANCOCIN) intermittent dosing (placeholder)   Other RX Placeholder    ipratropium-albuterol  1 ampule Inhalation Q4H WA     Continuous Infusions:   sodium chloride Stopped (02/05/23 0938)    sodium chloride 50 mL/hr at 02/05/23 1602    sodium chloride       PRN Meds:sodium chloride flush, sodium chloride, ondansetron **OR** ondansetron, polyethylene glycol, acetaminophen **OR** acetaminophen, LORazepam, sodium chloride, fentanNYL    Objective:    CBC:   Recent Labs     02/04/23 0035 02/04/23 0919 02/05/23 0241 02/05/23  0937 02/05/23  1353   WBC 23.2*  --  30.3*  --   --    HGB 4.5*   < > 7.1* 7.2* 7.3*   PLT See Reflexed IPF Result  --  See Reflexed IPF Result  --   --     < > = values in this interval not displayed. BMP:    Recent Labs     02/04/23 0035 02/05/23 0241 02/05/23 0937   * 136 135   K 3.2* 3.0* 4.0    107 106   CO2 18* 19* 21   BUN 8 7  --    CREATININE 0.60 0.46*  --    GLUCOSE 116* 113*  --      Calcium:  Recent Labs     02/05/23 0241   CALCIUM 8.7     Ionized Calcium:No results for input(s): IONCA in the last 72 hours. Magnesium:  Recent Labs     02/05/23 0937   MG 2.5     Phosphorus:No results for input(s): PHOS in the last 72 hours. BNP:No results for input(s): BNP in the last 72 hours. Glucose:No results for input(s): POCGLU in the last 72 hours. HgbA1C: No results for input(s): LABA1C in the last 72 hours.   INR:   Recent Labs     02/04/23 0035   INR 1.1     Hepatic:   Recent Labs     02/04/23 0035   ALKPHOS 68   ALT 13   AST 23   PROT 8.0   BILITOT 0.9   LABALBU 4.2     Amylase and Lipase:No results for input(s): LACTA, AMYLASE in the last 72 hours. Lactic Acid: No results for input(s): LACTA in the last 72 hours. CARDIAC ENZYMES:No results for input(s): CKTOTAL, CKMB, CKMBINDEX, TROPONINI in the last 72 hours. BNP: No results for input(s): BNP in the last 72 hours. Lipids: No results for input(s): CHOL, TRIG, HDL, LDL, LDLCALC in the last 72 hours. ABGs: No results found for: PH, PCO2, PO2, HCO3, O2SAT  Thyroid:   Lab Results   Component Value Date/Time    TSH 0.52 02/04/2023 01:24 AM        Urinalysis:   Recent Labs     02/04/23  0300   COLORU Yellow   PHUR 5.5   PROTEINU NEGATIVE   RBCUA None   SPECGRAV 1.012   BILIRUBINUR NEGATIVE   NITRU NEGATIVE   WBCUA None   LEUKOCYTESUR NEGATIVE   GLUCOSEU NEGATIVE           Assessment:  Principal Problem:    Crack cocaine overdose, accidental or unintentional, initial encounter ([facility] [address]  Resolved Problems:    * No resolved hospital problems. *          Plan:  1. Right Lower Lobe community-acquired pneumonia:  -Continue Vanco and Zosyn  - MRSA nasal DNA probe positive, blood cultures and urine cultures are pending and awaiting growth  - Legionella urine antigen, strep pneumo antigen negative  - Respiratory culture on direct exam showed mixed bacterial morphotypes, but the specimen showed more than 10 epithelial cells which can be contaminated  - Follow-up with the cultures    2. Sepsis:  -Likely due to pneumonia  - Leukocytosis, WBC count 30.3, tachycardia  -Gentle hydration with 0.9% NS at 50 mill per hour  - Blood culture, urine culture, respiratory cultures pending  - We will follow-up with the culture    3.  Blood loss anemia:  -Initial hemoglobin at ED 4.5  - Likely etiology menorrhagia  - Monitor H&H, status post 3 unit of PRBC  - Last hemoglobin 7.2  -MCV reduced, iron panel showed low iron level with low percent saturation indicating microcytic hypochromic anemia likely iron deficiency from abnormal neurologic bleeding  - Ganzoni equation for anemia showed 2043 mg of total iron deficit, will start fenofibrate 200 mg daily      4. Polysubstance abuse:  -UDS was positive for cocaine  - Monitoring for withdrawal  - Currently hemodynamically stable  - Continue thiamine and folate supplementation    5. COPD:  -Predominantly emphysema  - Currently not on exacerbation  - Continue DuoNeb every 4 hour  -Keep SPO2 more than 88%    6. Hypokalemia:  -Monitor BMP daily  - Replace potassium as needed         Owen Goode MD             Department of Internal Medicine  [facility]           2/5/2023, 8:03 PM   Attending Physician Statement    I have discussed the case of Martha Gomez, including pertinent history and exam findings with the resident. I have seen and examined the patient and the key elements of the encounter have been performed by me. I agree with the assessment, plan, and orders as documented by the resident. She was admitted with right lower lobe pneumonia and anemia due to blood loss. She has history of polysubstance abuse, today on my examination she appeared to be agitated and restless it is possible that this is a withdrawal syndrome from street drugs. She will will be given a small dose of lorazepam.  Her TSH is normal and she is not hypothyroid, she does have a long history of significant menorrhagia.   Electronically signed by Joselin Lomax MD on 2/6/2023 at 12:30 PM

## 2023-02-07 VITALS
DIASTOLIC BLOOD PRESSURE: 86 MMHG | HEIGHT: 62 IN | RESPIRATION RATE: 16 BRPM | SYSTOLIC BLOOD PRESSURE: 145 MMHG | HEART RATE: 85 BPM | BODY MASS INDEX: 34.67 KG/M2 | OXYGEN SATURATION: 100 % | WEIGHT: 188.38 LBS | TEMPERATURE: 98.5 F

## 2023-02-07 PROBLEM — D50.9 IRON DEFICIENCY ANEMIA: Chronic | Status: ACTIVE | Noted: 2019-08-08

## 2023-02-07 PROBLEM — F19.10 POLYSUBSTANCE ABUSE (HCC): Chronic | Status: ACTIVE | Noted: 2019-08-08

## 2023-02-07 PROBLEM — D64.9 ANEMIA: Status: ACTIVE | Noted: 2023-02-07

## 2023-02-07 PROBLEM — D64.9 ANEMIA: Chronic | Status: ACTIVE | Noted: 2023-02-07

## 2023-02-07 LAB
ABO/RH: NORMAL
ABSOLUTE EOS #: 0.24 K/UL (ref 0–0.44)
ABSOLUTE IMMATURE GRANULOCYTE: 0.12 K/UL (ref 0–0.3)
ABSOLUTE LYMPH #: 1.67 K/UL (ref 1.1–3.7)
ABSOLUTE MONO #: 1.07 K/UL (ref 0.1–1.2)
ANION GAP SERPL CALCULATED.3IONS-SCNC: 11 MMOL/L (ref 9–17)
ANTIBODY SCREEN: NEGATIVE
ARM BAND NUMBER: NORMAL
BASOPHILS # BLD: 1 % (ref 0–2)
BASOPHILS ABSOLUTE: 0.12 K/UL (ref 0–0.2)
BLD PROD TYP BPU: NORMAL
BLOOD BANK BLOOD PRODUCT EXPIRATION DATE: NORMAL
BLOOD BANK ISBT PRODUCT BLOOD TYPE: 7300
BLOOD BANK ISBT PRODUCT BLOOD TYPE: 9500
BLOOD BANK PRODUCT CODE: NORMAL
BLOOD BANK UNIT TYPE AND RH: NORMAL
BPU ID: NORMAL
BUN SERPL-MCNC: 6 MG/DL (ref 6–20)
CALCIUM SERPL-MCNC: 8.8 MG/DL (ref 8.6–10.4)
CHLORIDE SERPL-SCNC: 107 MMOL/L (ref 98–107)
CO2 SERPL-SCNC: 19 MMOL/L (ref 20–31)
CREAT SERPL-MCNC: 0.43 MG/DL (ref 0.5–0.9)
CROSSMATCH RESULT: NORMAL
DISPENSE STATUS BLOOD BANK: NORMAL
EOSINOPHILS RELATIVE PERCENT: 2 % (ref 1–4)
EXPIRATION DATE: NORMAL
GFR SERPL CREATININE-BSD FRML MDRD: >60 ML/MIN/1.73M2
GLUCOSE SERPL-MCNC: 97 MG/DL (ref 70–99)
HCT VFR BLD AUTO: 31.2 % (ref 36.3–47.1)
HGB BLD-MCNC: 8.4 G/DL (ref 11.9–15.1)
IMMATURE GRANULOCYTES: 1 %
LV EF: 62 %
LVEF MODALITY: NORMAL
LYMPHOCYTES # BLD: 14 % (ref 24–43)
MAGNESIUM SERPL-MCNC: 1.7 MG/DL (ref 1.6–2.6)
MCH RBC QN AUTO: 18.7 PG (ref 25.2–33.5)
MCHC RBC AUTO-ENTMCNC: 26.9 G/DL (ref 28.4–34.8)
MCV RBC AUTO: 69.3 FL (ref 82.6–102.9)
MONOCYTES # BLD: 9 % (ref 3–12)
MORPHOLOGY: ABNORMAL
NRBC AUTOMATED: 0.5 PER 100 WBC
PDW BLD-RTO: 33 % (ref 11.8–14.4)
PLATELET # BLD AUTO: ABNORMAL K/UL (ref 138–453)
PLATELET, FLUORESCENCE: NORMAL K/UL (ref 138–453)
POTASSIUM SERPL-SCNC: 3.9 MMOL/L (ref 3.7–5.3)
RBC # BLD: 4.5 M/UL (ref 3.95–5.11)
SEG NEUTROPHILS: 73 % (ref 36–65)
SEGMENTED NEUTROPHILS ABSOLUTE COUNT: 8.68 K/UL (ref 1.5–8.1)
SODIUM SERPL-SCNC: 137 MMOL/L (ref 135–144)
TRANSFUSION STATUS: NORMAL
UNIT DIVISION: 0
UNIT ISSUE DATE/TIME: NORMAL
VANCOMYCIN RANDOM: 13.4 UG/ML
WBC # BLD AUTO: 11.9 K/UL (ref 3.5–11.3)

## 2023-02-07 PROCEDURE — 6370000000 HC RX 637 (ALT 250 FOR IP)

## 2023-02-07 PROCEDURE — 36415 COLL VENOUS BLD VENIPUNCTURE: CPT

## 2023-02-07 PROCEDURE — 2580000003 HC RX 258: Performed by: STUDENT IN AN ORGANIZED HEALTH CARE EDUCATION/TRAINING PROGRAM

## 2023-02-07 PROCEDURE — 2580000003 HC RX 258

## 2023-02-07 PROCEDURE — 99239 HOSP IP/OBS DSCHRG MGMT >30: CPT | Performed by: INTERNAL MEDICINE

## 2023-02-07 PROCEDURE — 80048 BASIC METABOLIC PNL TOTAL CA: CPT

## 2023-02-07 PROCEDURE — 6360000002 HC RX W HCPCS: Performed by: INTERNAL MEDICINE

## 2023-02-07 PROCEDURE — 85055 RETICULATED PLATELET ASSAY: CPT

## 2023-02-07 PROCEDURE — 85025 COMPLETE CBC W/AUTO DIFF WBC: CPT

## 2023-02-07 PROCEDURE — 6370000000 HC RX 637 (ALT 250 FOR IP): Performed by: INTERNAL MEDICINE

## 2023-02-07 PROCEDURE — 6360000002 HC RX W HCPCS

## 2023-02-07 PROCEDURE — 83735 ASSAY OF MAGNESIUM: CPT

## 2023-02-07 PROCEDURE — 6360000002 HC RX W HCPCS: Performed by: STUDENT IN AN ORGANIZED HEALTH CARE EDUCATION/TRAINING PROGRAM

## 2023-02-07 PROCEDURE — 94640 AIRWAY INHALATION TREATMENT: CPT

## 2023-02-07 PROCEDURE — 93306 TTE W/DOPPLER COMPLETE: CPT

## 2023-02-07 PROCEDURE — 80202 ASSAY OF VANCOMYCIN: CPT

## 2023-02-07 RX ORDER — MULTIVITAMIN WITH IRON
1 TABLET ORAL DAILY
Qty: 60 TABLET | Refills: 2 | Status: SHIPPED | OUTPATIENT
Start: 2023-02-08 | End: 2023-03-10

## 2023-02-07 RX ORDER — LINEZOLID 600 MG/1
600 TABLET, FILM COATED ORAL 2 TIMES DAILY
Qty: 28 TABLET | Refills: 0 | Status: SHIPPED | OUTPATIENT
Start: 2023-02-07 | End: 2023-02-21

## 2023-02-07 RX ORDER — POLYETHYLENE GLYCOL 3350 17 G/17G
17 POWDER, FOR SOLUTION ORAL DAILY PRN
Qty: 527 G | Refills: 1 | Status: SHIPPED | OUTPATIENT
Start: 2023-02-07 | End: 2023-03-09

## 2023-02-07 RX ORDER — AMLODIPINE BESYLATE 5 MG/1
5 TABLET ORAL DAILY
Status: DISCONTINUED | OUTPATIENT
Start: 2023-02-07 | End: 2023-02-07 | Stop reason: HOSPADM

## 2023-02-07 RX ORDER — AMLODIPINE BESYLATE 5 MG/1
5 TABLET ORAL DAILY
Qty: 30 TABLET | Refills: 3 | Status: SHIPPED | OUTPATIENT
Start: 2023-02-08

## 2023-02-07 RX ORDER — LISINOPRIL 20 MG/1
20 TABLET ORAL DAILY
Status: DISCONTINUED | OUTPATIENT
Start: 2023-02-07 | End: 2023-02-07 | Stop reason: HOSPADM

## 2023-02-07 RX ADMIN — ALCOHOL 1 TABLET: 70.47 GEL TOPICAL at 09:47

## 2023-02-07 RX ADMIN — AMLODIPINE BESYLATE 5 MG: 5 TABLET ORAL at 09:50

## 2023-02-07 RX ADMIN — IRON SUCROSE 200 MG: 20 INJECTION, SOLUTION INTRAVENOUS at 11:38

## 2023-02-07 RX ADMIN — IPRATROPIUM BROMIDE AND ALBUTEROL SULFATE 1 AMPULE: 2.5; .5 SOLUTION RESPIRATORY (INHALATION) at 11:46

## 2023-02-07 RX ADMIN — GUAIFENESIN AND DEXTROMETHORPHAN 10 ML: 100; 10 SYRUP ORAL at 02:14

## 2023-02-07 RX ADMIN — LISINOPRIL 20 MG: 20 TABLET ORAL at 09:50

## 2023-02-07 RX ADMIN — LORAZEPAM 1 MG: 1 TABLET ORAL at 09:47

## 2023-02-07 RX ADMIN — Medication 1500 MG: at 04:38

## 2023-02-07 RX ADMIN — PIPERACILLIN AND TAZOBACTAM 3375 MG: 3; .375 INJECTION, POWDER, FOR SOLUTION INTRAVENOUS at 06:47

## 2023-02-07 ASSESSMENT — PAIN SCALES - GENERAL: PAINLEVEL_OUTOF10: 4

## 2023-02-07 NOTE — PROGRESS NOTES
Notified on-call internal medicine regarding /99 HR 75, no chest pain, no headache, with orders made and carried out.

## 2023-02-07 NOTE — PLAN OF CARE
Problem: Discharge Planning  Goal: Discharge to home or other facility with appropriate resources  Outcome: Completed     Problem: Pain  Goal: Verbalizes/displays adequate comfort level or baseline comfort level  2/7/2023 1652 by Uche Brock RN  Outcome: Completed  2/7/2023 0439 by Jose Davies RN  Outcome: Progressing     Problem: Skin/Tissue Integrity  Goal: Absence of new skin breakdown  Description: 1. Monitor for areas of redness and/or skin breakdown  2. Assess vascular access sites hourly  3. Every 4-6 hours minimum:  Change oxygen saturation probe site  4. Every 4-6 hours:  If on nasal continuous positive airway pressure, respiratory therapy assess nares and determine need for appliance change or resting period. Outcome: Completed     Problem: Safety - Adult  Goal: Free from fall injury  2/7/2023 1652 by Uche Brock RN  Outcome: Completed  2/7/2023 0439 by Jose Davies RN  Outcome: Progressing     Problem: Confusion  Goal: Confusion, delirium, dementia, or psychosis is improved or at baseline  Description: INTERVENTIONS:  1. Assess for possible contributors to thought disturbance, including medications, impaired vision or hearing, underlying metabolic abnormalities, dehydration, psychiatric diagnoses, and notify attending LIP  2. [place] high risk fall precautions, as indicated  3. Provide frequent short contacts to provide reality reorientation, refocusing and direction  4. Decrease environmental stimuli, including noise as appropriate  5. Monitor and intervene to maintain adequate nutrition, hydration, elimination, sleep and activity  6. If unable to ensure safety without constant attention obtain sitter and review sitter guidelines with assigned personnel  7.  Initiate Psychosocial CNS and Spiritual Care consult, as indicated  Outcome: Completed     Problem: ABCDS Injury Assessment  Goal: Absence of physical injury  2/7/2023 1652 by Uche Brock RN  Outcome: Completed  2/7/2023 0439 by Leonila Harmon RN  Outcome: Progressing     Problem: Respiratory - Adult  Goal: Achieves optimal ventilation and oxygenation  2/7/2023 1652 by Trina Negrete RN  Outcome: Completed  2/7/2023 0800 by Cole Peguero RCP  Outcome: Progressing

## 2023-02-07 NOTE — PROGRESS NOTES
Hgb is 6.5. Contacted Internal Med via [employer]. After calling lab to confirm critical hgb level of 6.5, Lab told writer that the critical value was not correct for this patient. Writer notified internal medicine via [employer] to disregard critical value.

## 2023-02-07 NOTE — DISCHARGE INSTRUCTIONS
YOU WERE HERE FOR PNEUMONIA ND ACUTE BLOOD LOSS ANEM IA  Follow up with your Pcp IN 1 week  Get CBC and Hemoglobin level in 1 week  Take medication as prescribed  Return to ED if symptoms worsen

## 2023-02-07 NOTE — PROGRESS NOTES
[address] [facility] Pharmacokinetic Monitoring Service - Vancomycin    Consulting Provider: Dr Jojo Cummings   Indication: CAP  Target Concentration: Goal AUC/JASMYN 400-600 mg*hr/L  Day of Therapy: 4  Additional Antimicrobials: ZOSYN    Pertinent Laboratory Values: Wt Readings from Last 1 Encounters:   02/06/23 188 lb 6 oz (85.4 kg)     Temp Readings from Last 1 Encounters:   02/07/23 98.5 °F (36.9 °C) (Oral)     Estimated Creatinine Clearance: 171 mL/min (A) (based on SCr of 0.43 mg/dL (L)).   Recent Labs     02/06/23  0459 02/07/23  0147   CREATININE 0.49* 0.43*   WBC 16.0* 11.9*     Procalcitonin:     Pertinent Cultures:  Culture Date Source Results        MRSA Nasal Swab:     Recent vancomycin administrations                     vancomycin (VANCOCIN) 1500 mg in sodium chloride 0.9 % 250 mL IVPB (mg) 1,500 mg New Bag 02/07/23 0438     1,500 mg New Bag 02/06/23 1803     1,500 mg New Bag  0424     1,500 mg New Bag 02/05/23 1347    vancomycin (VANCOCIN) 1250 mg in sodium chloride 0.9% 250 mL IVPB (mg) 1,250 mg New Bag 02/05/23 0313                    Assessment:  Date/Time Current Dose Concentration Timing of Concentration (h) AUC   2/7 1500 MG Q 12 HRS  13.4 7 HRS POST DOSE  429   Note: Serum concentrations collected for AUC dosing may appear elevated if collected in close proximity to the dose administered, this is not necessarily an indication of toxicity    Plan:  Current dosing regimen is therapeutic  Continue current dose  Repeat vancomycin concentration NOT ordered   Pharmacy will continue to monitor patient and adjust therapy as indicated    Thank you for the consult,  Lucrecia Bills, [facility]  2/7/2023 2:59 PM

## 2023-02-07 NOTE — DISCHARGE SUMMARY
[address]     Department of Internal Medicine - Staff Internal Medicine Teaching Service    INPATIENT DISCHARGE SUMMARY      Patient Identification:  Olamide Diaz is a 36 y.o. female. :  1980  MRN: [id number]     Acct: [de-identified]   PCP: Evelyn Prabhakar MD  Admit Date:  2/3/2023  Discharge date and time: No discharge date for patient encounter. Attending Provider: Prateek Fleming, [facility] Problem Lists:  Principal Problem:    Crack cocaine overdose, accidental or unintentional, initial encounter [facility])  Active Problems:    Tachycardia    Anemia  Resolved Problems:    * No resolved hospital problems. *      HOSPITAL STAY     Brief Inpatient course: The patient is a 36 y.o. female with past medical history significant for pharyngitis, Bell's palsy, menorrhagia who was initially admitted on 2/3/2023 with Hypokalemia [E87.6]  Tachycardia [R00.0]  Crack cocaine overdose, accidental or unintentional, initial encounter ([facility] [address] [T40.5X1A]  Pneumonia of right lower lobe due to infectious organism [J18.9]  Anemia, unspecified type [D64.9]  Fever in other diseases [R50.81] and presented with EMS due to shortness of breath and cough. According to the chart review given per EMS, patient had multiple history of crack cocaine abuse when the symptoms started. On arrival, patient complains of persistent cough with shortness of breathing. Cough with associated with productive phlegm production which was yellowish in nature and associated with pleuritic chest pain. In the ED she states she used crack cocaine. Also complained of severe menorrhagia for which she has past medical history of. Denied any abdominal pain.      In the ER , ED evaluation showed hemoglobin 4.5, WBC count 23.2, potassium level 3.2, troponins are mildly elevated and uptrending from 21-24, elevated D-dimer, CT PE was negative for pulmonary embolus in the central pulmonary artery but showed dense pneumonic consolidation in the anterior segment in the lateral part of right lower lobe of the lung, chest x-ray showed new dense pneumonia. Patient received 2 unit of packed RBC. Was initially transferred to the medicine floor where patient became agitated, tachycardic, tachypneic. Patient was deciding and eventually was transferred to ICU and was put on nasal cannula oxygen. In the ICU , patient was started on Vanco and Zosyn in the ICU. Blood cultures, urine cultures, Legionella and strep pneumo antigen were all negative. After 2 unit of packed RBC transfusion hemoglobin was 7.1.    ON 2/7-2023 patient's hemoglobin was stable at 8.4. Clinically she looks improved without any significant shortness of breath. Patient is ready to be discharged home with office follow-up.           Consults:     Consults:     Final Specialist Recommendations/Findings:   IP CONSULT TO CRITICAL CARE  PHARMACY TO DOSE VANCOMYCIN  IP CONSULT TO IV TEAM      Any Hospital Acquired Infections: none    Discharge Functional Status:  stable    DISCHARGE PLAN     Disposition: home    Patient Instructions:   Current Discharge Medication List        START taking these medications    Details   amLODIPine (NORVASC) 5 MG tablet Take 1 tablet by mouth daily  Qty: 30 tablet, Refills: 3      polyethylene glycol (GLYCOLAX) 17 g packet Take 17 g by mouth daily as needed for Constipation  Qty: 527 g, Refills: 1      linezolid (ZYVOX) 600 MG tablet Take 1 tablet by mouth 2 times daily for 14 days  Qty: 28 tablet, Refills: 0      Multiple Vitamin (MULTIVITAMIN) TABS tablet Take 1 tablet by mouth daily  Qty: 60 tablet, Refills: 2           CONTINUE these medications which have CHANGED    Details   beclomethasone (QVAR) 80 MCG/ACT inhaler Inhale 2 puffs into the lungs 2 times daily  Qty: 1 each, Refills: 1           CONTINUE these medications which have NOT CHANGED    Details   ferrous sulfate 325 (65 Fe) MG tablet Take 1 tablet by mouth daily (with breakfast)  Qty: 30 tablet, Refills: 0      lisinopril (PRINIVIL;ZESTRIL) 5 MG tablet Take 1 tablet by mouth daily  Qty: 30 tablet, Refills: 0      Menthol (CEPACOL SORE THROAT) 5.4 MG LOZG Use as needed  Qty: 15 lozenge, Refills: 0      omeprazole (PRILOSEC) 10 MG delayed release capsule Take 1 capsule by mouth daily  Qty: 30 capsule, Refills: 3           STOP taking these medications       ibuprofen (IBU) 400 MG tablet Comments:   Reason for Stopping:         ibuprofen (ADVIL;MOTRIN) 800 MG tablet Comments:   Reason for Stopping:               Activity: activity as tolerated    Diet: regular diet    Follow-up:    Wilson Connelly, [address] [place] [address]  [phone]    Call in 1 week(s)        Patient Instructions:   YOU WERE HERE FOR PNEUMONIA ND ACUTE BLOOD LOSS ANEM IA  Follow up with your Pcp IN 1 week  Get CBC and Hemoglobin level in 1 week  Take medication as prescribed  Return to ED if symptoms worsen          Paula Aburto MD,   Internal Medicine Resident, PGY-1  [facility]; [place], [place]  2/7/2023, 1:37 PM  Attending Physician Statement    I have discussed the case of Sarah Horner, including pertinent history and exam findings with the resident. I have seen and examined the patient and the key elements of the encounter have been performed by me. I agree with the assessment, plan, and orders as documented by the resident. The patient was discharged today her treatment plan was reviewed by me.   She was also examined by me and she did not have any significant distress except decreased breath sounds at the right lower base    Electronically signed by Jefry Angel MD on 2/7/2023 at 3:14 PM

## 2023-02-07 NOTE — PROGRESS NOTES
Patients /123 and HR 84. Notified Internal Med via [email or web address]. Patient is asymptomatic. Will continue to monitor.

## 2023-02-07 NOTE — PLAN OF CARE
Problem: Discharge Planning  Goal: Discharge to home or other facility with appropriate resources  2/6/2023 1743 by Charbel Disla RN  Outcome: Progressing     Problem: Safety - Adult  Goal: Free from fall injury  2/7/2023 0439 by Dereck Guiterrez RN  Outcome: Progressing  2/6/2023 1743 by Charbel Disla RN  Outcome: Progressing     Problem: ABCDS Injury Assessment  Goal: Absence of physical injury  2/7/2023 0439 by Dereck Gutierrez RN  Outcome: Progressing  2/6/2023 1743 by Charbel Disla RN  Outcome: Progressing

## 2023-02-08 NOTE — PROGRESS NOTES
CLINICAL PHARMACY NOTE: MEDS TO BEDS    Total # of Prescriptions Filled: 5   The following medications were delivered to the patient:  Amlodipine  Linezolid  Qvar inhaler  Miralax  multivit    Additional Documentation:

## 2023-02-09 LAB
MICROORGANISM SPEC CULT: NORMAL
MICROORGANISM SPEC CULT: NORMAL
SERVICE CMNT-IMP: NORMAL
SPECIMEN DESCRIPTION: NORMAL
SPECIMEN DESCRIPTION: NORMAL

## 2023-10-03 ENCOUNTER — HOSPITAL ENCOUNTER (EMERGENCY)
Age: 43
Discharge: HOME | End: 2023-10-03
Attending: EMERGENCY MEDICINE
Payer: MEDICAID

## 2023-10-03 VITALS
TEMPERATURE: 97.8 F | SYSTOLIC BLOOD PRESSURE: 140 MMHG | BODY MASS INDEX: 29.77 KG/M2 | OXYGEN SATURATION: 100 % | HEIGHT: 64 IN | WEIGHT: 174.38 LBS | HEART RATE: 78 BPM | DIASTOLIC BLOOD PRESSURE: 81 MMHG | RESPIRATION RATE: 18 BRPM

## 2023-10-03 DIAGNOSIS — J06.9 ACUTE UPPER RESPIRATORY INFECTION: Primary | ICD-10-CM

## 2023-10-03 PROCEDURE — 93005 ELECTROCARDIOGRAM TRACING: CPT | Performed by: STUDENT IN AN ORGANIZED HEALTH CARE EDUCATION/TRAINING PROGRAM

## 2023-10-03 PROCEDURE — 6370000000 HC RX 637 (ALT 250 FOR IP): Performed by: STUDENT IN AN ORGANIZED HEALTH CARE EDUCATION/TRAINING PROGRAM

## 2023-10-03 PROCEDURE — 99283 EMERGENCY DEPT VISIT LOW MDM: CPT | Performed by: EMERGENCY MEDICINE

## 2023-10-03 RX ORDER — GUAIFENESIN 600 MG/1
600 TABLET, EXTENDED RELEASE ORAL ONCE
Status: COMPLETED | OUTPATIENT
Start: 2023-10-03 | End: 2023-10-03

## 2023-10-03 RX ORDER — BENZONATATE 100 MG/1
100 CAPSULE ORAL ONCE
Status: COMPLETED | OUTPATIENT
Start: 2023-10-03 | End: 2023-10-03

## 2023-10-03 RX ORDER — IBUPROFEN 400 MG/1
600 TABLET ORAL ONCE
Status: COMPLETED | OUTPATIENT
Start: 2023-10-03 | End: 2023-10-03

## 2023-10-03 RX ORDER — BENZONATATE 100 MG/1
100 CAPSULE ORAL 3 TIMES DAILY PRN
Qty: 30 CAPSULE | Refills: 0 | Status: SHIPPED | OUTPATIENT
Start: 2023-10-03 | End: 2023-10-13

## 2023-10-03 RX ORDER — GUAIFENESIN 600 MG/1
600 TABLET, EXTENDED RELEASE ORAL 2 TIMES DAILY
Qty: 20 TABLET | Refills: 0 | Status: SHIPPED | OUTPATIENT
Start: 2023-10-03 | End: 2023-10-13

## 2023-10-03 RX ADMIN — GUAIFENESIN 600 MG: 600 TABLET, EXTENDED RELEASE ORAL at 16:09

## 2023-10-03 RX ADMIN — BENZONATATE 100 MG: 100 CAPSULE ORAL at 16:09

## 2023-10-03 RX ADMIN — IBUPROFEN 600 MG: 400 TABLET, FILM COATED ORAL at 16:09

## 2023-10-03 ASSESSMENT — ENCOUNTER SYMPTOMS
VOMITING: 0
TROUBLE SWALLOWING: 0
SINUS PRESSURE: 1
SHORTNESS OF BREATH: 0
EYE REDNESS: 0
ABDOMINAL PAIN: 0
SORE THROAT: 1
RHINORRHEA: 1
DIARRHEA: 0
COUGH: 1
CONSTIPATION: 0
BACK PAIN: 0
NAUSEA: 0
EYE DISCHARGE: 0

## 2023-10-03 ASSESSMENT — PAIN - FUNCTIONAL ASSESSMENT: PAIN_FUNCTIONAL_ASSESSMENT: NONE - DENIES PAIN

## 2023-10-03 NOTE — DISCHARGE INSTRUCTIONS
You were evaluated in the emergency department for upper respiratory tract infection-like symptoms as well as dizziness after coughing fits. You were taking DayQuil and NyQuil. Please take these medications as prescribed. Your dizziness is likely secondary to congestion and coughing from your upper respiratory tract infection. You were given medication in the emergency department. You were given prescriptions for medication. Please take these medications as prescribed. Please follow-up with your primary care physician. Please return to the emergency department for any worsening symptoms, questions or concerns.

## 2023-10-03 NOTE — ED TRIAGE NOTES
Patient presented to the ED today with complaints of dizziness, nausea, vomiting, nasal congestion, loss of appetite, and sweats. Patient states symptoms presented 4 days ago.

## 2023-10-03 NOTE — ED PROVIDER NOTES
[address] [place] [address] ED  Emergency Department Encounter  Emergency Medicine Resident     Pt Ray Wall  MRN: [id number]  [facility] Jasbir 1980  Date of evaluation: 10/3/23  PCP:  Diego Zhu MD  Note Started: 3:51 PM EDT      CHIEF COMPLAINT       Chief Complaint   Patient presents with    Dizziness    Nausea    Emesis    Nasal Congestion    Anorexia    Sweats       HISTORY OF PRESENT ILLNESS  (Location/Symptom, Timing/Onset, Context/Setting, Quality, Duration, Modifying Factors, Severity.)      Familia Amin is a 39 y.o. female who presents with nasal congestion, sinus pressure, headache, dizziness, cough. Patient states that she has been experiencing the symptoms over the past several days. States that this feels like she is experiencing a cold. States that she has experienced this in the past.  States that she is having nasal congestion. States that she is having a lot of mucus that is coming out of her nose when she blows her nose. Also endorsing cough. States that it is productive of mucus. Denies blood and mucus. No hemoptysis. No chest pain or shortness of breath. Denies fevers or chills. Patient states that after having a coughing episode or standing up too quickly she becomes slightly dizzy and lightheaded but recovers quickly. Has not fallen or syncopized. No history of seizures. No traumatic injuries. No chest pain or shortness of breath. No palpitations. No leg swelling. No back pain. PAST MEDICAL / SURGICAL / SOCIAL / FAMILY HISTORY      has a past medical history of Crack cocaine use, Hypertension, and Shoulder pain. has a past surgical history that includes Hand surgery (2011) and Abscess Drainage (Right, 07/02/2015).     Social History     Socioeconomic History    Marital status: Single     Spouse name: Not on file    Number of children: Not on file    Years of education: Not on file    Highest education level: Not on file   Occupational History

## 2023-10-04 LAB
EKG ATRIAL RATE: 73 BPM
EKG P AXIS: 52 DEGREES
EKG P-R INTERVAL: 158 MS
EKG Q-T INTERVAL: 396 MS
EKG QRS DURATION: 88 MS
EKG QTC CALCULATION (BAZETT): 436 MS
EKG R AXIS: 68 DEGREES
EKG T AXIS: 51 DEGREES
EKG VENTRICULAR RATE: 73 BPM

## 2023-10-04 PROCEDURE — 93010 ELECTROCARDIOGRAM REPORT: CPT | Performed by: INTERNAL MEDICINE

## 2024-06-07 NOTE — ED PROVIDER NOTES
[address] [facility]   Emergency Department  Emergency Medicine Attending Sign-out     Care of Tay Perez was assumed from previous attending Dr. Francisco Rodas and is being seen for Shortness of Breath and Cough  . The patient's initial evaluation and plan have been discussed with the prior provider who initially evaluated the patient. Attestation  I was available and discussed any additional care issues that arose and coordinated the management plans with the resident(s) caring for the patient during my duty period. Any areas of disagreement with resident's documentation of care or procedures are noted on the chart. I was personally present for the key portions of any/all procedures, during my duty period. I have documented in the chart those procedures where I was not present during the key portions. BRIEF PATIENT SUMMARY/MDM COURSE PER INITIAL PROVIDER:   RECENT VITALS:     Temp: (!) 101.1 °F (38.4 °C),  Heart Rate: (!) 138, Resp: (!) 35, BP: (!) 155/83, SpO2: 98 %    This patient is a 36 y.o. Female with history of cocaine user. Presented febrile, tachycardic, and tachypneic. Found to be anemic. Concern for infection given antibiotics.   Plan for admission likely MICU    DIAGNOSTICS/MEDICATIONS:     MEDICATIONS GIVEN:  ED Medication Orders (From admission, onward)      Start Ordered     Status Ordering Provider    02/04/23 0145 02/04/23 0134  piperacillin-tazobactam (ZOSYN) 3,375 mg in sodium chloride 0.9 % 50 mL IVPB (mini-bag)  ONCE        Question:  Antimicrobial Indications  Answer:  Pneumonia (CAP)    Ordered ELHAM HU    02/04/23 0145 02/04/23 0134  vancomycin (VANCOCIN) 1250 mg in sodium chloride 0.9% 250 mL IVPB  ONCE        Question:  Antimicrobial Indications  Answer:  Pneumonia (CAP)    Ordered ELHAM HU    02/04/23 0145 02/04/23 0134  0.9 % sodium chloride bolus  ONCE         Ordered ELHAM HU    02/04/23 0145 02/04/23 0141  acetaminophen Check BP before taking BP medications due to occasional episodes of hypotension.   (TYLENOL) tablet 1,000 mg  ONCE         Last MAR action: Given - by Mignon Giordano on 02/04/23 at [address], ELHAM GRANT    02/04/23 0143 02/04/23 0144  iopamidol (ISOVUE-370) 76 % injection 75 mL  IMG ONCE PRN         Last MAR action: Given - by Belem Melgoza on 02/04/23 at [facility], ELHAM GRANT    02/04/23 0130 02/04/23 0125  guaiFENesin (MUCINEX) extended release tablet 600 mg  ONCE         Last MAR action: Given - by Mignon Giordano on 02/04/23 at [address]    02/04/23 0051 02/04/23 0052  0.9 % sodium chloride infusion  PRN         Acknowledged ELHAM HU    02/04/23 0015 02/04/23 0004  midazolam PF (VERSED) injection 1 mg  ONCE         Last MAR action: Given - by Mignon Giordano on 02/04/23 at 00[address] [facility], ELHAM GRANT    02/04/23 0015 02/04/23 0004  0.9 % sodium chloride bolus  ONCE         Last MAR action: Stopped - by SANFORD DYE on 02/04/23 at [facility], ELHAM GRANT    02/04/23 0015 02/04/23 0005  ketorolac (TORADOL) injection 30 mg  ONCE         Last MAR action: Given - by Mignon Giordano on 02/04/23 at [address]            LABS    Labs Reviewed   CBC WITH AUTO DIFFERENTIAL - Abnormal; Notable for the following components:       Result Value    WBC 23.2 (*)     RBC 3.36 (*)     Hemoglobin 4.5 (*)     Hematocrit 19.1 (*)     MCV 56.8 (*)     MCH 13.4 (*)     MCHC 23.6 (*)     RDW 23.0 (*)     NRBC Automated 0.2 (*)     Seg Neutrophils 93 (*)     Lymphocytes 3 (*)     Eosinophils % 0 (*)     Segs Absolute 21.57 (*)     Absolute Lymph # 0.70 (*)     Absolute Mono # 0.93 (*)     All other components within normal limits   COMPREHENSIVE METABOLIC PANEL - Abnormal; Notable for the following components:    Glucose 116 (*)     Sodium 133 (*)     Potassium 3.2 (*)     CO2 18 (*)     All other components within normal limits   BRAIN NATRIURETIC PEPTIDE - Abnormal; Notable for the following components:    Pro- (*)     All other components within normal limits   TROPONIN - Abnormal; Notable for the following components:    Troponin, High Sensitivity 21 (*)     All other components within normal limits   APTT - Abnormal; Notable for the following components:    PTT 19.5 (*)     All other components within normal limits   TOX SCR, BLD, ED - Abnormal; Notable for the following components:    Acetaminophen Level <5 (*)     Salicylate Lvl <1 (*)     All other components within normal limits   CULTURE, BLOOD 1   CULTURE, BLOOD 1   COVID-19, RAPID   RAPID INFLUENZA A/B ANTIGENS   MAGNESIUM   HCG, SERUM, QUALITATIVE   D-DIMER, QUANTITATIVE   PROTIME-INR   IMMATURE PLATELET FRACTION   LACTIC ACID   HEMOGLOBIN AND HEMATOCRIT   TROPONIN   TYPE AND SCREEN   PREPARE RBC (CROSSMATCH)       RADIOLOGY  No results found.     OUTSTANDING TASKS / ADDITIONAL COMMENTS   Awaiting labs, pelvic exam, and admission    Repeat EKG  Tachycardia age-indeterminate anterior infarct    Merced Becker MD  Emergency Medicine Attending  [facility]        Joseph Wood MD  02/04/23 8429